# Patient Record
Sex: FEMALE | Race: WHITE | NOT HISPANIC OR LATINO | Employment: OTHER | URBAN - NONMETROPOLITAN AREA
[De-identification: names, ages, dates, MRNs, and addresses within clinical notes are randomized per-mention and may not be internally consistent; named-entity substitution may affect disease eponyms.]

---

## 2022-12-27 ENCOUNTER — APPOINTMENT (EMERGENCY)
Dept: RADIOLOGY | Facility: HOSPITAL | Age: 82
End: 2022-12-27

## 2022-12-27 ENCOUNTER — APPOINTMENT (EMERGENCY)
Dept: CT IMAGING | Facility: HOSPITAL | Age: 82
End: 2022-12-27

## 2022-12-27 ENCOUNTER — HOSPITAL ENCOUNTER (INPATIENT)
Facility: HOSPITAL | Age: 82
LOS: 3 days | Discharge: HOME HEALTH | End: 2022-12-30
Attending: EMERGENCY MEDICINE | Admitting: FAMILY MEDICINE

## 2022-12-27 DIAGNOSIS — T79.6XXA TRAUMATIC RHABDOMYOLYSIS, INITIAL ENCOUNTER (HCC): ICD-10-CM

## 2022-12-27 DIAGNOSIS — I48.91 ATRIAL FIBRILLATION WITH RVR (HCC): Primary | ICD-10-CM

## 2022-12-27 DIAGNOSIS — K80.20 CALCULUS OF GALLBLADDER WITHOUT CHOLECYSTITIS WITHOUT OBSTRUCTION: ICD-10-CM

## 2022-12-27 DIAGNOSIS — M62.82 RHABDOMYOLYSIS: ICD-10-CM

## 2022-12-27 DIAGNOSIS — R65.10 SIRS (SYSTEMIC INFLAMMATORY RESPONSE SYNDROME) (HCC): ICD-10-CM

## 2022-12-27 DIAGNOSIS — D72.829 LEUKOCYTOSIS, UNSPECIFIED TYPE: ICD-10-CM
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
## 2022-12-27 PROBLEM — E66.01 MORBID OBESITY (HCC): Status: ACTIVE | Noted: 2022-12-27
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
## 2022-12-27 PROBLEM — E87.20 LACTIC ACIDOSIS: Status: ACTIVE | Noted: 2022-12-27

## 2022-12-27 LAB
2HR DELTA HS TROPONIN: 0 NG/L
ALBUMIN SERPL BCP-MCNC: 3.7 G/DL (ref 3.5–5)
ALP SERPL-CCNC: 49 U/L (ref 46–116)
ALT SERPL W P-5'-P-CCNC: 29 U/L (ref 12–78)
ANION GAP SERPL CALCULATED.3IONS-SCNC: 15 MMOL/L (ref 4–13)
APTT PPP: 24 SECONDS (ref 23–37)
AST SERPL W P-5'-P-CCNC: 53 U/L (ref 5–45)
BASOPHILS # BLD AUTO: 0.13 THOUSANDS/ÂΜL (ref 0–0.1)
BASOPHILS NFR BLD AUTO: 1 % (ref 0–1)
BILIRUB SERPL-MCNC: 3.55 MG/DL (ref 0.2–1)
BUN SERPL-MCNC: 18 MG/DL (ref 5–25)
CALCIUM SERPL-MCNC: 9.2 MG/DL (ref 8.3–10.1)
CARDIAC TROPONIN I PNL SERPL HS: 14 NG/L
CARDIAC TROPONIN I PNL SERPL HS: 14 NG/L
CHLORIDE SERPL-SCNC: 102 MMOL/L (ref 96–108)
CK MB SERPL-MCNC: 7.7 NG/ML (ref 0–5)
CK MB SERPL-MCNC: <1 % (ref 0–2.5)
CK SERPL-CCNC: 1293 U/L (ref 26–192)
CO2 SERPL-SCNC: 24 MMOL/L (ref 21–32)
CREAT SERPL-MCNC: 0.95 MG/DL (ref 0.6–1.3)
EOSINOPHIL # BLD AUTO: 0.11 THOUSAND/ÂΜL (ref 0–0.61)
EOSINOPHIL NFR BLD AUTO: 1 % (ref 0–6)
ERYTHROCYTE [DISTWIDTH] IN BLOOD BY AUTOMATED COUNT: 13.1 % (ref 11.6–15.1)
FLUAV RNA RESP QL NAA+PROBE: NEGATIVE
FLUBV RNA RESP QL NAA+PROBE: NEGATIVE
GFR SERPL CREATININE-BSD FRML MDRD: 56 ML/MIN/1.73SQ M
GLUCOSE SERPL-MCNC: 118 MG/DL (ref 65–140)
HCT VFR BLD AUTO: 49.6 % (ref 34.8–46.1)
HGB BLD-MCNC: 16.1 G/DL (ref 11.5–15.4)
IMM GRANULOCYTES # BLD AUTO: 0.18 THOUSAND/UL (ref 0–0.2)
IMM GRANULOCYTES NFR BLD AUTO: 1 % (ref 0–2)
INR PPP: 1.21 (ref 0.84–1.19)
LACTATE SERPL-SCNC: 1.8 MMOL/L (ref 0.5–2)
LACTATE SERPL-SCNC: 3.5 MMOL/L (ref 0.5–2)
LYMPHOCYTES # BLD AUTO: 1.3 THOUSANDS/ÂΜL (ref 0.6–4.47)
LYMPHOCYTES NFR BLD AUTO: 5 % (ref 14–44)
MCH RBC QN AUTO: 30.5 PG (ref 26.8–34.3)
MCHC RBC AUTO-ENTMCNC: 32.5 G/DL (ref 31.4–37.4)
MCV RBC AUTO: 94 FL (ref 82–98)
MONOCYTES # BLD AUTO: 1.92 THOUSAND/ÂΜL (ref 0.17–1.22)
MONOCYTES NFR BLD AUTO: 8 % (ref 4–12)
NEUTROPHILS # BLD AUTO: 20.44 THOUSANDS/ÂΜL (ref 1.85–7.62)
NEUTS SEG NFR BLD AUTO: 84 % (ref 43–75)
NRBC BLD AUTO-RTO: 0 /100 WBCS
PLATELET # BLD AUTO: 243 THOUSANDS/UL (ref 149–390)
PMV BLD AUTO: 11.5 FL (ref 8.9–12.7)
POTASSIUM SERPL-SCNC: 4.1 MMOL/L (ref 3.5–5.3)
PROT SERPL-MCNC: 7.6 G/DL (ref 6.4–8.4)
PROTHROMBIN TIME: 15.4 SECONDS (ref 11.6–14.5)
RBC # BLD AUTO: 5.28 MILLION/UL (ref 3.81–5.12)
RSV RNA RESP QL NAA+PROBE: NEGATIVE
SARS-COV-2 RNA RESP QL NAA+PROBE: NEGATIVE
SODIUM SERPL-SCNC: 141 MMOL/L (ref 135–147)
WBC # BLD AUTO: 24.08 THOUSAND/UL (ref 4.31–10.16)

## 2022-12-27 RX ORDER — DILTIAZEM HYDROCHLORIDE 5 MG/ML
15 INJECTION INTRAVENOUS ONCE
Status: COMPLETED | OUTPATIENT
Start: 2022-12-27 | End: 2022-12-27

## 2022-12-27 RX ORDER — METOPROLOL TARTRATE 5 MG/5ML
5 INJECTION INTRAVENOUS EVERY 6 HOURS PRN
Status: DISCONTINUED | OUTPATIENT
Start: 2022-12-27 | End: 2022-12-30 | Stop reason: HOSPADM

## 2022-12-27 RX ORDER — SODIUM CHLORIDE 9 MG/ML
125 INJECTION, SOLUTION INTRAVENOUS CONTINUOUS
Status: DISCONTINUED | OUTPATIENT
Start: 2022-12-27 | End: 2022-12-28

## 2022-12-27 RX ORDER — ONDANSETRON 2 MG/ML
4 INJECTION INTRAMUSCULAR; INTRAVENOUS EVERY 6 HOURS PRN
Status: DISCONTINUED | OUTPATIENT
Start: 2022-12-27 | End: 2022-12-30 | Stop reason: HOSPADM

## 2022-12-27 RX ORDER — METOPROLOL TARTRATE 50 MG/1
50 TABLET, FILM COATED ORAL 2 TIMES DAILY
Status: DISCONTINUED | OUTPATIENT
Start: 2022-12-27 | End: 2022-12-28

## 2022-12-27 RX ORDER — METOPROLOL TARTRATE 50 MG/1
50 TABLET, FILM COATED ORAL 2 TIMES DAILY
COMMUNITY
Start: 2022-04-05 | End: 2022-12-30

## 2022-12-27 RX ORDER — ACETAMINOPHEN 325 MG/1
650 TABLET ORAL EVERY 6 HOURS PRN
Status: DISCONTINUED | OUTPATIENT
Start: 2022-12-27 | End: 2022-12-30 | Stop reason: HOSPADM

## 2022-12-27 RX ORDER — DOCUSATE SODIUM 100 MG/1
100 CAPSULE, LIQUID FILLED ORAL 2 TIMES DAILY
Status: DISCONTINUED | OUTPATIENT
Start: 2022-12-27 | End: 2022-12-30 | Stop reason: HOSPADM

## 2022-12-27 RX ORDER — METOPROLOL TARTRATE 5 MG/5ML
5 INJECTION INTRAVENOUS ONCE
Status: COMPLETED | OUTPATIENT
Start: 2022-12-27 | End: 2022-12-27

## 2022-12-27 RX ADMIN — SODIUM CHLORIDE 125 ML/HR: 0.9 INJECTION, SOLUTION INTRAVENOUS at 21:45

## 2022-12-27 RX ADMIN — SODIUM CHLORIDE 1000 ML: 0.9 INJECTION, SOLUTION INTRAVENOUS at 17:48

## 2022-12-27 RX ADMIN — DILTIAZEM HYDROCHLORIDE 15 MG: 5 INJECTION INTRAVENOUS at 18:20

## 2022-12-27 RX ADMIN — DOCUSATE SODIUM 100 MG: 100 CAPSULE ORAL at 19:48

## 2022-12-27 RX ADMIN — IOHEXOL 100 ML: 350 INJECTION, SOLUTION INTRAVENOUS at 17:57

## 2022-12-27 RX ADMIN — METOPROLOL TARTRATE 50 MG: 50 TABLET, FILM COATED ORAL at 19:48

## 2022-12-27 RX ADMIN — METOROPROLOL TARTRATE 5 MG: 5 INJECTION, SOLUTION INTRAVENOUS at 18:18

## 2022-12-27 RX ADMIN — SODIUM CHLORIDE 1000 ML: 0.9 INJECTION, SOLUTION INTRAVENOUS at 19:03

## 2022-12-27 NOTE — ED PROVIDER NOTES
Emergency Department Trauma Note  Ngozi Cano [de-identified] y o  female MRN: [id number]  Unit/Bed#: ED 03/ED 03 Encounter: [id number]      Trauma Alert: Trauma Acuity: Trauma Evaluation  Model of Arrival: Mode of Arrival: BLS (none) via    Trauma Team: Current Providers  Attending Provider: Shawn Perez MD  Attending Provider: Chrissie Peck MD  Registered Nurse: Suyapa Pardo RN  Consultants: None      History of Present Illness     Chief Complaint:   Chief Complaint   Patient presents with   • Fall     Pt tripped & fell 12/26 @ 0900  Did not hit head, denies LOC  Pt was on the floor for 26 hours  Pt's only complaint is left rib pain  Pt A&Ox4  HPI:  Ngozi Cano is a [de-identified] y o  female who presents with fall  Mechanism:Details of Incident: Pt walking to kitchen with two full ands and triped over carpet and fell to the ground  Pt laid on floor for 26 hours unable to get up  Injury Date: 12/26/22 Injury Time: 0900 Injury Occurence Location - [address] [place] [address] Way: Kitchen    Patient was carrying things with both hands  Tripped over her rug at 9 AM   Truman Nova to the floor  Is on Eliquis  States she did not hit her head  This occurred yesterday morning  Has been on the floor since then  States she used the trash can to go to the bathroom  Crawled to the phone to get help today  No chest pain  No shortness of breath  No abdominal pain or back pain  Complains of a large bruise on her left breast   States she could not get up because both her legs felt heavy  No change in speech or vision        History provided by:  Patient  Fall  Mechanism of injury: fall    Injury location: Left chest   Incident location:  Home  Time since incident:  32 hours  Fall:     Fall occurred:  [facility] [employer] of impact: Left chest     Entrapped after fall: no    Protective equipment: none    Suspicion of alcohol use: no    Suspicion of drug use: no    Prior to arrival data:     Bystander interventions:  None    Patient ambulatory at scene: no      Blood loss:  None    Responsiveness at scene:  Alert    Orientation at scene:  Person, place, situation and time    Loss of consciousness: no      Amnesic to event: no      Airway interventions:  None    Breathing interventions:  None    IV access status:  None    IO access:  None    Fluids administered:  None    Cardiac interventions:  None    Medications administered:  None    Immobilization:  None    Airway condition since incident:  Stable    Breathing condition since incident:  Stable    Circulation condition since incident:  Stable    Mental status condition since incident:  Stable    Disability condition since incident:  Stable  Associated symptoms: nausea    Associated symptoms: no abdominal pain, no back pain, no chest pain, no headaches, no hearing loss, no neck pain, no seizures and no vomiting    Risk factors: anticoagulation therapy      Review of Systems   Constitutional: Negative for chills and fever  HENT: Negative for ear pain, hearing loss, sore throat, trouble swallowing and voice change  Eyes: Negative for pain and discharge  Respiratory: Negative for cough, shortness of breath and wheezing  Cardiovascular: Negative for chest pain and palpitations  Gastrointestinal: Positive for nausea  Negative for abdominal pain, blood in stool, constipation, diarrhea and vomiting  Genitourinary: Negative for dysuria, flank pain, frequency and hematuria  Musculoskeletal: Negative for back pain, joint swelling, neck pain and neck stiffness  Skin: Negative for rash and wound  Neurological: Negative for dizziness, seizures, syncope, facial asymmetry and headaches  Psychiatric/Behavioral: Negative for hallucinations, self-injury and suicidal ideas  All other systems reviewed and are negative        Historical Information     Immunizations:   Immunization History   Administered Date(s) Administered   • COVID-19 PFIZER VACCINE 0 3 ML IM 02/23/2021       Past Medical History: Diagnosis Date   • Atrial fibrillation [facility])    • Breast cancer in female [facility])      History reviewed  No pertinent family history  Past Surgical History:   Procedure Laterality Date   • BREAST SURGERY       Social History     Tobacco Use   • Smoking status: Former     Types: Cigarettes   • Smokeless tobacco: Never   Substance Use Topics   • Alcohol use: Never   • Drug use: Never     E-Cigarette/Vaping     E-Cigarette/Vaping Substances       Family History: Noncontributory    Meds/Allergies   Prior to Admission Medications   Prescriptions Last Dose Informant Patient Reported? Taking? apixaban (ELIQUIS) 5 mg   Yes Yes   Sig: Take 5 mg by mouth 2 (two) times a day   metoprolol tartrate (LOPRESSOR) 50 mg tablet   Yes Yes   Sig: Take 50 mg by mouth 2 (two) times a day      Facility-Administered Medications: None       Allergies   Allergen Reactions   • Penicillins Anaphylaxis       PHYSICAL EXAM    PE limited by: Nothing    Objective   Vitals:   First set: Temperature: 98 8 °F (37 1 °C) (12/27/22 1735)  Pulse: (!) 121 (12/27/22 1735)  Respirations: 18 (12/27/22 1735)  Blood Pressure: 121/69 (12/27/22 1735)  SpO2: 96 % (12/27/22 1735)    Primary Survey:   (A) Airway: Intact  (B) Breathing: Spontaneous  (C) Circulation: Pulses: Radial pulse 2+ bilaterally  (D) Disabliity: GCS 15  (E) Expose: Done    Secondary Survey: (Click on Physical Exam tab above)  Physical Exam  Vitals and nursing note reviewed  Constitutional:       General: She is not in acute distress  Appearance: She is well-developed  HENT:      Head: Normocephalic and atraumatic  Right Ear: External ear normal       Left Ear: External ear normal    Eyes:      General: No scleral icterus  Right eye: No discharge  Left eye: No discharge  Extraocular Movements: Extraocular movements intact  Conjunctiva/sclera: Conjunctivae normal    Cardiovascular:      Rate and Rhythm: Tachycardia present  Rhythm irregular        Heart sounds: Normal heart sounds  No murmur heard  Pulmonary:      Effort: Pulmonary effort is normal       Breath sounds: Normal breath sounds  No wheezing or rales  Comments: Large contusion over the superior aspect of the left breast   Abdominal:      General: Bowel sounds are normal  There is no distension  Palpations: Abdomen is soft  Tenderness: There is no abdominal tenderness  There is no guarding or rebound  Musculoskeletal:         General: No tenderness or deformity  Cervical back: Normal range of motion and neck supple  Comments: Good range of motion of the lower extremities with passive movement  Skin:     General: Skin is warm and dry  Findings: No rash  Neurological:      General: No focal deficit present  Mental Status: She is alert and oriented to person, place, and time  Cranial Nerves: No cranial nerve deficit  Psychiatric:         Mood and Affect: Mood normal          Behavior: Behavior normal          Thought Content: Thought content normal          Judgment: Judgment normal          Cervical spine cleared by clinical criteria? No    Invasive Devices     Peripheral Intravenous Line  Duration           Peripheral IV 12/27/22 Left Antecubital <1 day                Lab Results:   Results Reviewed     Procedure Component Value Units Date/Time    HS Troponin 0hr (reflex protocol) [[id number]]  (Normal) Collected: 12/27/22 1823    Lab Status: Final result Specimen: Blood from Hand, Left Updated: 12/27/22 1850     hs TnI 0hr 14 ng/L     HS Troponin I 2hr [[id number]]     Lab Status: No result Specimen: Blood     CK (with reflex to MB) [[id number]]  (Abnormal) Collected: 12/27/22 1743    Lab Status: Final result Specimen: Blood from Arm, Left Updated: 12/27/22 1845     Total CK 1,293 U/L     CKMB [[id number]] Collected: 12/27/22 1743    Lab Status:  In process Specimen: Blood from Arm, Left Updated: 12/27/22 1845    FLU/RSV/COVID - if FLU/RSV clinically relevant [[id number]]  (Normal) Collected: 12/27/22 1748    Lab Status: Final result Specimen: Nares from Nose Updated: 12/27/22 1829     SARS-CoV-2 Negative     INFLUENZA A PCR Negative     INFLUENZA B PCR Negative     RSV PCR Negative    Narrative:      FOR PEDIATRIC PATIENTS - copy/paste COVID Guidelines URL to browser: [email or web address]    SARS-CoV-2 assay is a Nucleic Acid Amplification assay intended for the  qualitative detection of nucleic acid from SARS-CoV-2 in nasopharyngeal  swabs  Results are for the presumptive identification of SARS-CoV-2 RNA  Positive results are indicative of infection with SARS-CoV-2, the virus  causing COVID-19, but do not rule out bacterial infection or co-infection  with other viruses  Laboratories within the [place] and its  territories are required to report all positive results to the appropriate  public health authorities  Negative results do not preclude SARS-CoV-2  infection and should not be used as the sole basis for treatment or other  patient management decisions  Negative results must be combined with  clinical observations, patient history, and epidemiological information  This test has not been FDA cleared or approved  This test has been authorized by FDA under an Emergency Use Authorization  (EUA)  This test is only authorized for the duration of time the  declaration that circumstances exist justifying the authorization of the  emergency use of an in vitro diagnostic tests for detection of SARS-CoV-2  virus and/or diagnosis of COVID-19 infection under section 564(b)(1) of  the Act, 21 U  S C  045WFS-2(I)(7), unless the authorization is terminated  or revoked sooner  The test has been validated but independent review by FDA  and CLIA is pending  Test performed using "[employer] GeneXpert: This RT-PCR assay targets N2,  a region unique to SARS-CoV-2   A conserved region in the E-gene was chosen  for pan-Sarbecovirus detection which includes SARS-CoV-2  According to CMS-2020-01-R, this platform meets the definition of high-throughput technology  Lactic acid [[id number]]  (Abnormal) Collected: 12/27/22 1752    Lab Status: Final result Specimen: Blood from Arm, Left Updated: 12/27/22 1827     LACTIC ACID 3 5 mmol/L     Narrative:      Result may be elevated if tourniquet was used during collection  Lactic acid 2 Hours [[id number]]     Lab Status: No result Specimen: Blood     Blood culture #2 [[id number]] Collected: 12/27/22 1816    Lab Status:  In process Specimen: Blood from Hand, Left Updated: 12/27/22 1818    Comprehensive metabolic panel [[id number]]  (Abnormal) Collected: 12/27/22 1743    Lab Status: Final result Specimen: Blood from Arm, Left Updated: 12/27/22 1817     Sodium 141 mmol/L      Potassium 4 1 mmol/L      Chloride 102 mmol/L      CO2 24 mmol/L      ANION GAP 15 mmol/L      BUN 18 mg/dL      Creatinine 0 95 mg/dL      Glucose 118 mg/dL      Calcium 9 2 mg/dL      AST 53 U/L      ALT 29 U/L      Alkaline Phosphatase 49 U/L      Total Protein 7 6 g/dL      Albumin 3 7 g/dL      Total Bilirubin 3 55 mg/dL      eGFR 56 ml/min/1 73sq m     Narrative:      Barbara guidelines for Chronic Kidney Disease (CKD):   •  Stage 1 with normal or high GFR (GFR > 90 mL/min/1 73 square meters)  •  Stage 2 Mild CKD (GFR = 60-89 mL/min/1 73 square meters)  •  Stage 3A Moderate CKD (GFR = 45-59 mL/min/1 73 square meters)  •  Stage 3B Moderate CKD (GFR = 30-44 mL/min/1 73 square meters)  •  Stage 4 Severe CKD (GFR = 15-29 mL/min/1 73 square meters)  •  Stage 5 End Stage CKD (GFR <15 mL/min/1 73 square meters)  Note: GFR calculation is accurate only with a steady state creatinine    Protime-INR [[id number]]  (Abnormal) Collected: 12/27/22 1743    Lab Status: Final result Specimen: Blood from Arm, Left Updated: 12/27/22 1800     Protime 15 4 seconds      INR 1 21    APTT [[id number]] (Normal) Collected: 12/27/22 1743    Lab Status: Final result Specimen: Blood from Arm, Left Updated: 12/27/22 1800     PTT 24 seconds     Blood culture #1 [[id number]] Collected: 12/27/22 1750    Lab Status: In process Specimen: Blood from Arm, Left Updated: 12/27/22 1752    CBC and differential [[id number]]  (Abnormal) Collected: 12/27/22 1743    Lab Status: Final result Specimen: Blood from Arm, Left Updated: 12/27/22 1747     WBC 24 08 Thousand/uL      RBC 5 28 Million/uL      Hemoglobin 16 1 g/dL      Hematocrit 49 6 %      MCV 94 fL      MCH 30 5 pg      MCHC 32 5 g/dL      RDW 13 1 %      MPV 11 5 fL      Platelets 528 Thousands/uL      nRBC 0 /100 WBCs      Neutrophils Relative 84 %      Immat GRANS % 1 %      Lymphocytes Relative 5 %      Monocytes Relative 8 %      Eosinophils Relative 1 %      Basophils Relative 1 %      Neutrophils Absolute 20 44 Thousands/µL      Immature Grans Absolute 0 18 Thousand/uL      Lymphocytes Absolute 1 30 Thousands/µL      Monocytes Absolute 1 92 Thousand/µL      Eosinophils Absolute 0 11 Thousand/µL      Basophils Absolute 0 13 Thousands/µL     UA w Reflex to Microscopic w Reflex to Culture [[id number]]     Lab Status: No result Specimen: Urine                  Imaging Studies:   Direct to CT: Yes  TRAUMA - CT head wo contrast   Final Result by Leonie Banuelos MD (12/27 1810)      No acute intracranial pathology  In particular, no intracranial hemorrhage or calvarial fracture  The study was marked in [facility] for immediate notification  Workstation performed: [id number]         TRAUMA - CT spine cervical wo contrast   Final Result by Leonie Banuelos MD (12/27 1814)      No cervical spine fracture or traumatic malalignment  Moderate to severe degenerative changes most pronounced at C4-C7  The study was marked in [facility] for immediate notification        Workstation performed: [id number]         TRAUMA - CT chest abdomen pelvis w contrast   Final Result by Ang Fisher MD (12/27 1836)      No evidence of acute thoracic, abdominal, or pelvic trauma  Endometrial stripe thickened up to 6 mm  Recommend further evaluation with nonemergent pelvic ultrasound  This may be related to endometrial hyperplasia, an endometrial polyp or possibly neoplasm  1 6 x 1 4 cm somewhat irregular lesion in the inner lower right breast with bulky calcifications  This may be related to postsurgical changes from prior lumpectomy  Recommend correlation with prior breast imaging  Multiple additional incidental findings as above  The study was marked in [facility] for immediate notification  Workstation performed: [id number]         XR Trauma chest portable   ED Interpretation by Shawn Perez MD (12/27 5107)   No pneumothorax      Final Result by Ang Fisher MD (12/27 4907)      No acute pulmonary pathology  Workstation performed: [id number]         XR Trauma pelvis ap only 1 or 2 vw   ED Interpretation by Shawn Perez MD (12/27 2045)   No fracture      Final Result by Ang Fisher MD (12/27 5847)      No definite fracture or hip dislocation        Workstation performed: [id number]               Procedures  ECG 12 Lead Documentation Only    Date/Time: 12/27/2022 5:40 PM  Performed by: Shawn Perez MD  Authorized by: Shawn Perez MD     ECG reviewed by me, the ED Provider: yes    Patient location:  ED  Rate:     ECG rate:  140  Rhythm:     Rhythm: atrial fibrillation      CriticalCare Time  Performed by: Shawn Perez MD  Authorized by: Shawn Perez MD     Critical care provider statement:     Critical care time (minutes):  35    Critical care was necessary to treat or prevent imminent or life-threatening deterioration of the following conditions:  Cardiac failure and trauma    Critical care was time spent personally by me on the following activities:  Evaluation of patient's response to treatment, examination of patient, review of old charts, re-evaluation of patient's condition, ordering and review of radiographic studies, ordering and review of laboratory studies and ordering and performing treatments and interventions             ED Course  ED Course as of 12/27/22 1855   Aleksandere Dec 27, 2022   1809 Echo done in 2021 with an EF of 55 to 60%  1816 Patient with a BMI greater than 30  Will use ideal body weight for the IV fluid bolus  1817 Cervical Collar Clearance: The patient had a CT scan of the cervical spine demonstrating no acute injury  On exam, the patient had no midline point tenderness or paresthesias/numbness/weakness in the extremities  The patient had full range of motion (was then able to flex, extend, and rotate head laterally) without pain  There were no distracting injuries and the patient was not intoxicated  The patient's cervical spine was cleared radiologically and clinically  Cervical collar removed at this time  Shawn Perez MD  12/27/2022 6:18 PM       1855 CAT scan result noted  Patient has no abdominal pain in the right upper quadrant             MDM  Number of Diagnoses or Management Options     Amount and/or Complexity of Data Reviewed  Clinical lab tests: reviewed  Review and summarize past medical records: yes            Disposition  [facility] Transfer: No  Final diagnoses:   Atrial fibrillation with RVR (HCC)   SIRS (systemic inflammatory response syndrome) (HCC)   Leukocytosis, unspecified type   Calculus of gallbladder without cholecystitis without obstruction   Rhabdomyolysis     Time reflects when diagnosis was documented in both MDM as applicable and the Disposition within this note     Time User Action Codes Description Comment    12/27/2022  6:14 PM Juliann Rung Add [I48 91] Atrial fibrillation with RVR ([facility] )     12/27/2022  6:22 PM Risa PEDERSEN Add [R65 10] SIRS (systemic inflammatory response syndrome) ([facility] )     12/27/2022  6:40 PM Juliann Rung Add [A36 605] Leukocytosis, unspecified type     12/27/2022  6:46 PM NathanWander tomasraymundohannah Hermosillo Add [K80 20] Calculus of gallbladder without cholecystitis without obstruction     12/27/2022  6:47 PM Jeannine Loza Add [J97 42] Rhabdomyolysis       ED Disposition     ED Disposition   Admit    Condition   Stable    Date/Time   Tue Dec 27, 2022  6:54 PM    Comment   Case was discussed with  Dr Deandre Garcia and the patient's admission status was agreed to be  to the service of Dr Deandre Garcia   Follow-up Information    None       Patient's Medications   Discharge Prescriptions    No medications on file     No discharge procedures on file      PDMP Review     None          ED Provider  Electronically Signed by         Brandon Mclean MD  12/27/22 0047

## 2022-12-27 NOTE — LETTER
[address] [place] [address]  [address] [facility] [address]      January 6, 2023    MRN: [id number]     Phone: [phone]     Dear Ms  Eli Medina recently had a(n)  performed on  at  [address] [place] [address] that was requested by Mary Nieves MD  The study was reviewed by a radiologist, which is a physician who specializes in medical imaging  The radiologist issued a report describing his or her findings  In that report there was a finding that the radiologist felt warranted further discussion with your health care provider and that discussion would be beneficial to you  The results were sent to Mary Nieves MD on    We recommend that you contact Mary Nieves MD at  or set up an appointment to discuss the results of the imaging test  If you have already heard from Mary Nieves MD regarding the results of your study, you can disregard this letter  This letter is not meant to alarm you, but intended to encourage you to follow-up on your results with the provider that sent you for the imaging study  In addition, we have enclosed answers to frequently asked questions by other patients who have also received a letter to review results with their health care provider (see page two)  Thank you for choosing [facility] for your medical imaging needs  FREQUENTLY ASKED QUESTIONS    1  Why am I receiving this letter? [facility] requires us to notify patients who have findings on imaging exams that may require more testing or follow-up with a health professional within the next 3 months  2  How serious is the finding on the imaging test?  This letter is sent to all patients who may need follow-up or more testing within the next 3 months    Receiving this letter does not necessarily mean you have a life-threatening imaging finding or disease  Recommendations in the radiologist’s imaging report are general in nature and it is up to your healthcare provider to say whether those recommendations make sense for your situation  You are strongly encouraged to talk to your health care provider about the results and ask whether additional steps need to be taken  3  Where can I get a copy of the final report for my recent radiology exam?  To get a full copy of the report you can access your records online at [email or web address] or please contact [facility] Medical Records Department at [phone] Monday through Friday between 8 am and 6 pm          4  What do I need to do now? Please contact your health care provider who requested the imaging study to discuss what further actions (if any) are needed  You may have already heard from (your ordering provider) in regard to this test in which case you can disregard this letter  NOTICE IN ACCORDANCE WITH THE [place] STATE “PATIENT TEST RESULT INFORMATION ACT OF 2018”    You are receiving this notice as a result of a determination by your diagnostic imaging service that further discussions of your test results are warranted and would be beneficial to you  The complete results of your test or tests have been or will be sent to the health care practitioner that ordered the test or tests  It is recommended that you contact your health care practitioner to discuss your results as soon as possible

## 2022-12-28 PROBLEM — T79.6XXA TRAUMATIC RHABDOMYOLYSIS (HCC): Status: ACTIVE | Noted: 2022-12-27

## 2022-12-28 LAB
4HR DELTA HS TROPONIN: 0 NG/L
ALBUMIN SERPL BCP-MCNC: 2.8 G/DL (ref 3.5–5)
ALP SERPL-CCNC: 39 U/L (ref 46–116)
ALT SERPL W P-5'-P-CCNC: 23 U/L (ref 12–78)
ANION GAP SERPL CALCULATED.3IONS-SCNC: 14 MMOL/L (ref 4–13)
AST SERPL W P-5'-P-CCNC: 35 U/L (ref 5–45)
BILIRUB SERPL-MCNC: 3.31 MG/DL (ref 0.2–1)
BUN SERPL-MCNC: 16 MG/DL (ref 5–25)
CALCIUM ALBUM COR SERPL-MCNC: 9.1 MG/DL (ref 8.3–10.1)
CALCIUM SERPL-MCNC: 8.1 MG/DL (ref 8.3–10.1)
CARDIAC TROPONIN I PNL SERPL HS: 14 NG/L
CHLORIDE SERPL-SCNC: 107 MMOL/L (ref 96–108)
CK MB SERPL-MCNC: 3.5 NG/ML (ref 0–5)
CK MB SERPL-MCNC: <1 % (ref 0–2.5)
CK SERPL-CCNC: 733 U/L (ref 26–192)
CO2 SERPL-SCNC: 22 MMOL/L (ref 21–32)
CREAT SERPL-MCNC: 0.77 MG/DL (ref 0.6–1.3)
ERYTHROCYTE [DISTWIDTH] IN BLOOD BY AUTOMATED COUNT: 13.2 % (ref 11.6–15.1)
GFR SERPL CREATININE-BSD FRML MDRD: 73 ML/MIN/1.73SQ M
GLUCOSE SERPL-MCNC: 102 MG/DL (ref 65–140)
HCT VFR BLD AUTO: 42.8 % (ref 34.8–46.1)
HGB BLD-MCNC: 13.7 G/DL (ref 11.5–15.4)
MCH RBC QN AUTO: 30.6 PG (ref 26.8–34.3)
MCHC RBC AUTO-ENTMCNC: 32 G/DL (ref 31.4–37.4)
MCV RBC AUTO: 96 FL (ref 82–98)
PLATELET # BLD AUTO: 195 THOUSANDS/UL (ref 149–390)
PMV BLD AUTO: 11.2 FL (ref 8.9–12.7)
POTASSIUM SERPL-SCNC: 3.9 MMOL/L (ref 3.5–5.3)
PROT SERPL-MCNC: 6 G/DL (ref 6.4–8.4)
QRS AXIS: 97 DEGREES
QRSD INTERVAL: 74 MS
QT INTERVAL: 286 MS
QTC INTERVAL: 439 MS
RBC # BLD AUTO: 4.47 MILLION/UL (ref 3.81–5.12)
SODIUM SERPL-SCNC: 143 MMOL/L (ref 135–147)
T WAVE AXIS: -13 DEGREES
TSH SERPL DL<=0.05 MIU/L-ACNC: 1.33 UIU/ML (ref 0.45–4.5)
VENTRICULAR RATE: 142 BPM
WBC # BLD AUTO: 22.13 THOUSAND/UL (ref 4.31–10.16)

## 2022-12-28 RX ORDER — METOPROLOL TARTRATE 5 MG/5ML
5 INJECTION INTRAVENOUS ONCE
Status: COMPLETED | OUTPATIENT
Start: 2022-12-28 | End: 2022-12-28

## 2022-12-28 RX ORDER — DILTIAZEM HYDROCHLORIDE 5 MG/ML
15 INJECTION INTRAVENOUS ONCE
Status: COMPLETED | OUTPATIENT
Start: 2022-12-28 | End: 2022-12-28

## 2022-12-28 RX ADMIN — METOROPROLOL TARTRATE 5 MG: 5 INJECTION, SOLUTION INTRAVENOUS at 01:53

## 2022-12-28 RX ADMIN — METOROPROLOL TARTRATE 5 MG: 5 INJECTION, SOLUTION INTRAVENOUS at 18:02

## 2022-12-28 RX ADMIN — APIXABAN 5 MG: 5 TABLET, FILM COATED ORAL at 09:19

## 2022-12-28 RX ADMIN — DILTIAZEM HYDROCHLORIDE 15 MG: 5 INJECTION INTRAVENOUS at 11:46

## 2022-12-28 RX ADMIN — METOROPROLOL TARTRATE 5 MG: 5 INJECTION, SOLUTION INTRAVENOUS at 07:05

## 2022-12-28 RX ADMIN — APIXABAN 5 MG: 5 TABLET, FILM COATED ORAL at 17:40

## 2022-12-28 RX ADMIN — METOPROLOL TARTRATE 50 MG: 50 TABLET, FILM COATED ORAL at 07:18

## 2022-12-28 RX ADMIN — METOPROLOL TARTRATE 75 MG: 50 TABLET, FILM COATED ORAL at 17:40

## 2022-12-28 RX ADMIN — METOROPROLOL TARTRATE 5 MG: 5 INJECTION, SOLUTION INTRAVENOUS at 09:18

## 2022-12-28 NOTE — ASSESSMENT & PLAN NOTE
She had a mechanical fall at home where she tripped over a rug and was unable to stand up afterwards for almost 30 hours  she did not take any of her medications or eat or drink for that time  Finally was able to call 911 and her family    Will hydrate the patient and get PT OT eval   Fortunately trauma series is negative for any fractures  Bruising noted secondary to being on the floor on her left breast and her legs which do not appear to be too concerning however we resume Eliquis

## 2022-12-28 NOTE — ASSESSMENT & PLAN NOTE
Lactic acidosis of 3 5 noted secondary to dehydration and rhabdomyolysis  placed on IV fluids and recheck in the morning  Also noted to have leukocytosis secondary to dehydration but no evidence of infection at this time

## 2022-12-28 NOTE — CASE MANAGEMENT
Case Management Assessment & Discharge Planning Note    Patient name Cathi Mccollum  Location [address] [id number] MRN [id number]  : 1940 Date 2022       Current Admission Date: 2022  Current Admission Diagnosis:Fall at home, initial encounter   Patient Active Problem List    Diagnosis Date Noted   • Non-traumatic rhabdomyolysis 2022   • Fall at home, initial encounter 2022   • Atrial fibrillation with RVR ([facility] [address] ) 2022   • Lactic acidosis 2022   • Morbid obesity ([facility] [address] ) 2022      LOS (days): 1  Geometric Mean LOS (GMLOS) (days): 3 00  Days to GMLOS:2 3     OBJECTIVE:    Risk of Unplanned Readmission Score: 9 84         Current admission status: Inpatient       Preferred Pharmacy:   [address] [place] [address]  Phone: [phone] Fax: [phone]    Primary Care Provider: Kavita Castrejon DO    Primary Insurance: [facility]  Secondary Insurance:     ASSESSMENT:  [address] Proxies    There are no active Health Care Proxies on file  Advance Directives  Does patient have a [address]?: No  Was patient offered paperwork?: Yes (patient declined)  Does patient currently have a Health Care decision maker?: Yes, please see Health Care Proxy section  Does patient have Advance Directives?: No  Was patient offered paperwork?: Yes (patient declined)  Primary Contact: Jessica Lópezstephani, daughter         Readmission Root Cause  30 Day Readmission: No    Patient Information  Admitted from[de-identified] Home  Mental Status: Alert  During Assessment patient was accompanied by: Daughter  Assessment information provided by[de-identified] Patient  Primary Caregiver: Self  Support Systems: Daughter, Family members  [place] of Residence: [facility] do you live in?: [facility] entry access options   Select all that apply : Stairs  Number of steps to enter home : 7 (7 steps in front, 4 steps in back)  Do the steps have railings?: Yes  Type of Current Residence: 2 story home  Upon entering residence, is there a bedroom on the main floor (no further steps)?: No  A bedroom is located on the following floor levels of residence (select all that apply):: 2nd Floor  Upon entering residence, is there a bathroom on the main floor (no further steps)?: No  Indicate which floors of current residence have a bathroom (select all the apply):: 2nd Floor  Number of steps to 2nd floor from main floor: One Flight  In the last 12 months, was there a time when you were not able to pay the mortgage or rent on time?: No  In the last 12 months, how many places have you lived?: 1  In the last 12 months, was there a time when you did not have a steady place to sleep or slept in a shelter (including now)?: No  Homeless/housing insecurity resource given?: N/A  Living Arrangements: Lives Alone  Is patient a ?: No    Activities of Daily Living Prior to Admission  Functional Status: Independent  Completes ADLs independently?: Yes  Ambulates independently?: Yes  Does patient use assisted devices?: Yes  Assisted Devices (DME) used: Straight Cane  Does patient currently own DME?: Yes  What DME does the patient currently own?: Straight Cane, Rollator  Does patient have a history of Outpatient Therapy (PT/OT)?: No  Does the patient have a history of Short-Term Rehab?: No  Does patient have a history of HHC?: No  Does patient currently have [address]?: No         Patient Information Continued  Income Source: SSI/SSD  Does patient have prescription coverage?: Yes  Within the past 12 months, you worried that your food would run out before you got the money to buy more : Never true  Within the past 12 months, the food you bought just didn't last and you didn't have money to get more : Never true  Food insecurity resource given?: N/A  Does patient receive dialysis treatments?: No  Does patient have a history of substance abuse?: No  Does patient have a history of Mental Health Diagnosis?: No         Means of Transportation  Means of Transport to Appts[de-identified] Drives Self  In the past 12 months, has lack of transportation kept you from medical appointments or from getting medications?: No  In the past 12 months, has lack of transportation kept you from meetings, work, or from getting things needed for daily living?: No  Was application for public transport provided?: N/A        DISCHARGE DETAILS:    Discharge planning discussed with[de-identified] patient and daughterLety Pronto of Choice: Yes  Comments - [place] of Choice: Becky [facility]  CM contacted family/caregiver?: Yes  Were Treatment Team discharge recommendations reviewed with patient/caregiver?: Yes  Did patient/caregiver verbalize understanding of patient care needs?: Yes  Were patient/caregiver advised of the risks associated with not following Treatment Team discharge recommendations?: Yes    Contacts  Patient Contacts: ravi Broussard  Relationship to Patient[de-identified] Family  Contact Method:  In Person  Reason/Outcome: Continuity of Care, Discharge [address]         Is the patient interested in [facility] at discharge?: Yes  [address] requested[de-identified] Physical Therapy, Occupational Therapy, Nursing  Home Health Agency Name[de-identified] [address] [facility] Provider[de-identified] PCP  Home Health Services Needed[de-identified] Evaluate Functional Status and Safety, Gait/ADL Training, Strengthening/Theraputic Exercises to Improve Function, Other (comment) (Medication Management)  Homebound Criteria Met[de-identified] Uses an Assist Device (i e  cane, walker, etc)  Supporting Clincal Findings[de-identified] Limited Endurance    DME Referral Provided  Referral made for DME?: Yes  DME referral completed for the following items[de-identified] Fabiola [place]  DME Supplier Name[de-identified] [employer]    Other Referral/Resources/Interventions Provided:  Interventions: [facility]  Referral Comments: Becky         Treatment Team Recommendation: Home with  Place [facility]  Discharge Destination Plan[de-identified] Home with [place] at Discharge : Family            CM met with patient and daughter Bette Vyas at the bedside, baseline information was obtained  CM discussed the role of CM in helping the patient develop a discharge plan and assist the patient in carry out their plan  Patient lives independently in 2 story home, uses cane and owns rollator but liked walking with RW with therapy  CM submitted [place] referral for Veron[facility] for patient  CM discussed with patient therapy recommendation for [facility]  Patient reluctant but agreebale to referral to [facility] only  A post acute care recommendation was made by your care team for [facility]  Discussed Freedom of Choice with patient  Offered patient blanket referral for agencies via in person  patient aware the list is custom by insurance and patient's medical needs  CM submitted [facility] referral for [facility]       CM submitted [place] request for [facility]on[facility]

## 2022-12-28 NOTE — PHYSICAL THERAPY NOTE
PHYSICAL THERAPY EVALUATION  NAME:  Yaima Baltazar  DATE: 12/28/22    AGE:   [de-identified] y o  Mrn:   [id number]  ADMIT DX:  Rhabdomyolysis [M62 82]  Pain [R52]  SIRS (systemic inflammatory response syndrome) (HCC) [R65 10]  Atrial fibrillation with RVR (HCC) [I48 91]  Calculus of gallbladder without cholecystitis without obstruction [K80 20]  Leukocytosis, unspecified type [D72 829]    Past Medical History:   Diagnosis Date   • Atrial fibrillation ([facility] [address] )    • Breast cancer in female [facility])      Length Of Stay: 1  Performed at least 2 patient identifiers during session: Name and Birthday  PHYSICAL THERAPY EVALUATION :    12/28/22 1049   PT Last Visit   PT Visit Date 12/28/22   Note Type   Note type Evaluation   Pain Assessment   Pain Assessment Tool FLACC   Pain Location/Orientation Orientation: Left; Location: Arm  (flank)   Pain Rating: FLACC (Rest) - Face 0   Pain Rating: FLACC (Rest) - Legs 0   Pain Rating: FLACC (Rest) - Activity 0   Pain Rating: FLACC (Rest) - Cry 0   Pain Rating: FLACC (Rest) - Consolability 0   Score: FLACC (Rest) 0   Pain Rating: FLACC (Activity) - Face 1   Pain Rating: FLACC (Activity) - Legs 0   Pain Rating: FLACC (Activity) - Activity 0   Pain Rating: FLACC (Activity) - Cry 1   Pain Rating: FLACC (Activity) - Consolability 0   Score: FLACC (Activity) 2   Restrictions/Precautions   Other Precautions Bed Alarm; Fall Risk;Pain;Multiple lines;Telemetry   Home Living   Type of Home House  (4 R HR +4 L HR to enter home)   Home Layout Two level;Bed/bath upstairs  (15 steps R HR, but doesn have a chair lift)   Bathroom Shower/Tub Tub/shower unit   Bathroom Toilet Standard   Bathroom Equipment Shower chair;Grab bars in [address]  (has rollator-doesn't use-brand new from a friend)   Additional Comments Reports living in a 2 SH with 4+4 MIKIE and FF to 2nd floor  Reports using cane for mobility   Prior Function   Level of [place] Independent with ADLs; Independent with functional mobility; Independent with IADLS   Lives With Alone  (4 cats)   Receives Help From Family   IADLs Independent with driving; Independent with medication management; Independent with meal prep   Falls in the last 6 months 1 to 4   Comments Reports being independent with mobility, ADLs and IADLs  Reports using spc for mobility  Cognition   Orientation Level Oriented X4   Following Commands Follows one step commands without difficulty   Subjective   Subjective "I have a different color cane for each outfit  I have about 15 canes at home  RLE Assessment   RLE Assessment WFL  (4/5)   LLE Assessment   LLE Assessment WFL  (4/5)   Coordination   Rapid Alternating Movements Intact   Light Touch   RLE Light Touch Grossly intact   LLE Light Touch Grossly intact   Bed Mobility   Supine to Sit 5  Supervision   Additional items Increased time required;Verbal cues   Additional Comments HOB flat without bedrails  inc time with min cues for technique   Transfers   Sit to Stand   (SBA)   Additional items Increased time required;Verbal cues   Stand to Sit   (SBA)   Additional items Increased time required;Verbal cues   Stand pivot   (steadying assistance-->SBA)   Additional items Increased time required;Verbal cues   Additional Comments use of spc  inc time to achieve standing  wide ADAM  spt without AD with steayding assistance with inc time with min cues for turning completely prior to sitting  pt guarded  Ambulation/Elevation   Gait pattern Wide ADAM; Short stride; Excessively slow  (guarded initially)   Gait Assistance   (steadying assistance--->SBA)   Additional items Assist x 1;Verbal cues   Assistive Device Straight cane   Distance ambulated 90'x2 with spc with steadying assistance progressing to stand by assistance with slow david, decreased step length, cues for increased step length and foot clearance  pt with slight path deviation     Stair Management Assistance   (stand by assistance)   Additional items Increased time required   Stair Management Technique One rail L;With cane; Alternating pattern   Number of Stairs 5   Balance   Static Sitting Normal   Dynamic Sitting Good   Static Standing Fair +   Dynamic Standing Fair   Ambulatory Fair -   Endurance Deficit   Endurance Deficit Yes   Endurance Deficit Description HR elevated from 120s to 150s wiht activity  min BERG  Activity Tolerance   Activity Tolerance Patient limited by fatigue;Patient limited by pain   Medical Staff Made Aware Jose MEADOWS   Nurse Made Aware Xiao ZULETA   Assessment   Prognosis Good   Problem List Decreased strength;Decreased endurance; Impaired balance;Decreased mobility; Decreased safety awareness; Obesity;Pain   Barriers to Discharge Decreased caregiver support   Barriers to Discharge Comments lives alone   Goals   Patient Goals "Go home"   STG Expiration Date 01/11/23   PT Treatment Day 1   Plan   Treatment/Interventions ADL retraining;Functional transfer training;LE strengthening/ROM; Elevations; Therapeutic exercise; Endurance training;Patient/family training;Equipment eval/education; Bed mobility;Gait training; Compensatory technique education;Spoke to nursing;Spoke to case management;OT   PT Frequency 3-5x/wk   Recommendation   PT Discharge Recommendation Home with home health rehabilitation   Equipment Recommended [facility] Recommended Wheeled walker   Change/add to ididwork?  Yes, Change Size   Walker Size [place] (Ht <5'1")   Additional Comments increased support from family recommended   [address] Mobility Inpatient   Turning in Bed Without Bedrails 3   Lying on Back to Sitting on Edge of Flat Bed 3   Moving Bed to Chair 3   Standing Up From Chair 3   Walk in Room 3   Climb 3-5 Stairs 3   Basic Mobility Inpatient Raw Score 18   Basic Mobility Standardized Score 41 05   Highest Level Of Mobility   JH-HLM Goal 6: Walk 10 steps or more   JH-HLM Achieved 8: Walk 250 feet ot more   Additional Treatment Session   Start Time 1102   End Time 1110   Treatment Assessment Pt tolerated session fairly well  She was able to ambulate with decreased assistance and complete transfers wiht decreased assistance with use of RW compared to spc  She reports fatigue after associated with elevated HR  She is limited by decreased strength, balance, endurance  She will continue to benefit from PT services to maximize LOF  Equipment Use initiated use of RW  min cues for hand placement for safety wiht use of RW  sit<>stand with RW with supervision  spt with RW with supervision wiht min cues for turning completely prior to sitting  ambulated 80'x1 with RW with Supervision with slow david with min cues for inc step length and min cues to stay within ADAM of RW especially when turning  End of Consult   Patient Position at End of Consult Bedside chair; All needs within reach     (Please find full objective findings from PT assessment regarding body systems outlined above)  Assessment: Pt is a [de-identified] y o  female seen for PT evaluation s/p admission to [address] [place] [address] on 12/27/2022 with Fall at home, initial encounter  Order placed for PT services  Upon evaluation: Pt is presenting with impaired functional mobility due to pain, decreased strength, decreased endurance, impaired balance, gait deviations, decreased safety awareness, fall risk, and LE edema requiring  supervision assistance for bed mobility, stand by to steadying assistance for transfers, and steadying to stand by assistance for ambulation with spc    Pt's clinical presentation is currently unpredictable given the functional mobility deficits above, especially weakness, edema of extremities, decreased endurance, gait deviations, pain, decreased activity tolerance, decreased functional mobility tolerance, decreased safety awareness, and SOB upon exertion, coupled with fall risks as indicated by AM-PAC 6-Clicks: 69/73 as well as hx of falls, impaired balance, polypharmacy, and decreased safety awareness and combined with medical complications of tachycardia, pain impacting overall mobility status, abnormal WBCs, need for input for mobility technique/safety and abnormal lactic acid, abnormal CK and CKMB with non-traumatic rhabdomyolysis, Afib with RVR  Pt's PMHx and comorbidities that may affect physical performance and progress include: A fib, obesity and breast CA  Personal factors affecting pt at time of IE include: step(s) to enter environment, multi-level environment, inability to perform IADLs, inability to perform ADLs, inability to navigate level surfaces without external assistance, inability to ambulate household distances and recent fall(s)/fall history  Pt will benefit from continued skilled PT services to address deficits as defined above and to maximize level of functional mobility to facilitate return toward PLOF and improved QOL  From PT/mobility standpoint, recommendation at time of d/c would be Home PT, home with family support and with walker pending progress in order to reduce fall risk and maximize pt's functional independence and consistency with mobility in order to facilitate return to PLOF  Recommend trial with walker next 1-2 sessions, trial with cane next 1-2 sessions and ther ex next 1-2 sessions  The patient's AM-PAC Basic Mobility Inpatient Short Form Raw Score is 18  A Raw score of greater than 16 suggests the patient may benefit from discharge to home  Please also refer to the recommendation of the Physical Therapist for safe discharge planning  Goals: Pt will: Perform bed mobility tasks with modified I to reposition in bed and prepare for transfers  Pt will perform transfers with modified I to decrease burden of care and decrease risk for falls and prepare for ambulation  Pt will ambulate with LRAD for >/= 200' with  modified I  to decrease burden of care, decrease risk for falls, improve activity tolerance and improve gait quality and to access home environment  Pt will complete >/= 15 steps with with unilateral handrail with modified I to return to home with MIKIE, return to multilevel home, decrease risk for falls and improve activity tolerance  Pt will participate in objective balance assessment to determine baseline fall risk  Pt will increase B LE strength >/= 1/2 MMT grade to facilitate functional mobility  Pt will participate in SSWS to facilitate discharge planning and independence       Juanita Keane, PT,DPT

## 2022-12-28 NOTE — ASSESSMENT & PLAN NOTE
Lactic acidosis of 3 5 noted secondary to dehydration and rhabdomyolysis  placed on IV fluids  Also noted to have leukocytosis secondary to dehydration but no evidence of infection at this time

## 2022-12-28 NOTE — ASSESSMENT & PLAN NOTE
fell at home and was on the ground for almost 30 hours    Noted to have CK total of 1293   placed on IV fluid hydration and recheck CK daily  renall function is normal so far

## 2022-12-28 NOTE — ASSESSMENT & PLAN NOTE
Lactic acidosis of 3 5 noted secondary to dehydration and rhabdomyolysis poa  placed on IV fluids  Also noted to have leukocytosis secondary to dehydration but no evidence of infection at this time

## 2022-12-28 NOTE — ASSESSMENT & PLAN NOTE
Presented with A  fib with RVR with heart rates in the 170s secondary to not taking her metoprolol for 30 hours    Start home dose of metoprolol and placed on as needed IV Lopressor in case heart rate more than 120  Restart Eliquis from tomorrow morning after reviewing CBC

## 2022-12-28 NOTE — ASSESSMENT & PLAN NOTE
fell at home and was on the ground for almost 30 hours    Noted to have CK total of 1293   placed on IV fluid hydration and recheck CK in the morning  renall function is normal so far

## 2022-12-28 NOTE — OCCUPATIONAL THERAPY NOTE
Occupational Therapy Evaluation     Patient Name: Iman Wallace  [id number] Date: 12/28/2022  Problem List  Principal Problem:    Fall at home, initial encounter  Active Problems:    Non-traumatic rhabdomyolysis    Atrial fibrillation with RVR ([facility] [address] )    Lactic acidosis    Morbid obesity ([facility] [address] )    Past Medical History  Past Medical History:   Diagnosis Date    Atrial fibrillation ([facility] [address] )     Breast cancer in female [facility])      Past Surgical History  Past Surgical History:   Procedure Laterality Date    BREAST SURGERY           12/28/22 1050   Note Type   Note type Evaluation   Pain Assessment   Pain Assessment Tool FLACC   Pain Location/Orientation Orientation: Left; Location: Arm  (L flank)   Pain Rating: FLACC (Rest) - Face 0   Pain Rating: FLACC (Rest) - Legs 0   Pain Rating: FLACC (Rest) - Activity 0   Pain Rating: FLACC (Rest) - Cry 0   Pain Rating: FLACC (Rest) - Consolability 0   Score: FLACC (Rest) 0   Pain Rating: FLACC (Activity) - Face 1   Pain Rating: FLACC (Activity) - Legs 0   Pain Rating: FLACC (Activity) - Activity 0   Pain Rating: FLACC (Activity) - Cry 1   Pain Rating: FLACC (Activity) - Consolability 0   Score: FLACC (Activity) 2   Restrictions/Precautions   Other Precautions Bed Alarm; Fall Risk;Multiple lines   Home Living   Type of Home House  (4 + 4 MIKIE 1 HR)   Home Layout Two level;Bed/bath upstairs  (15 steps R HR  Pt reports she does have a stair lift if needed)   Bathroom Shower/Tub Tub/shower unit   Bathroom Toilet Standard   Bathroom Equipment Grab bars in shower; Shower chair   [address] [facility]  (uses [address]  has rollator but does not use)   Additional Comments Pt reports living in a 2 story home with 4+4 MIKIE  Pt ambulates with SPC at baseline   Prior Function   Level of [place] Independent with ADLs; Independent with functional mobility; Independent with IADLS   Lives With Alone  (4 cats)   Receives Help From Family   IADLs Independent with driving; Independent with medication management; Independent with meal prep   Falls in the last 6 months 1 to 4   Comments Pt reports completing ADLs, IADLs, functional ambulation and community mobility + driving @ I  Pt has asistance from family for heavy IADLs PRN   ADL   Where Assessed Edge of bed   Grooming Assistance 6  Modified Independent   Grooming Deficit Setup   UB Dressing Assistance 6  Modified independent   UB Dressing Deficit Setup   LB Dressing Assistance   (SBA)   LB Dressing Deficit Use of adaptive equipment; Don/doff L sock; Don/doff R sock; Supervision/safety;Verbal cueing; Increased time to complete; Requires assistive device for steadying   Additional Comments Pt completing ADL tasks while seated at EOB,  UBD ressing and grooming tasks @ Mod I after set-up  LB Dressing @ SBA for balance/safety while completing CM around waist  Pt reports utilizing a aock aide for donning socks at home  Pt verbalizes no concerns at this time  Bed Mobility   Supine to Sit 5  Supervision   Additional items Increased time required;Verbal cues   Additional Comments HOB flat, no bedrails  increased time PRN   Transfers   Sit to Stand 5  Supervision   Additional items Increased time required;Verbal cues   Stand to Sit 5  Supervision   Additional items Increased time required;Verbal cues   Stand pivot 5  Supervision   Additional items Increased time required;Verbal cues   Additional Comments Pt completing functional transfers with use of RW For UB support  S for STS and SPT wiht increased time and occasional cues for RW managmenet  no LOB noted   Pt reports "this is much easier" (regarding use of RW)   Balance   Static Sitting Normal   Dynamic Sitting Good   Static Standing Fair +   Dynamic Standing Fair   Activity Tolerance   Activity Tolerance Patient limited by fatigue;Patient limited by pain   Medical Staff Made Aware Spoke with PT, Darren Chambers   Nurse Made Aware Spoke with RN   RUE Assessment   RUE Assessment WFL   LUE Assessment   LUE Assessment WFL   Hand Function   Gross Motor Coordination Functional   Fine Motor Coordination Functional   Sensation   Light Touch No apparent deficits   Cognition   Overall Cognitive Status WFL   Arousal/Participation Alert; Responsive; Cooperative   Attention Attends with cues to redirect   Orientation Level Oriented X4   Memory Within functional limits   Following Commands Follows all commands and directions without difficulty   Assessment   Limitation Decreased ADL status; Decreased UE strength;Decreased endurance;Decreased self-care trans;Decreased high-level ADLs   Prognosis Good   Assessment Pt is a [de-identified] y o  female, admitted to [address] [place] [address] 12/27/2022 d/t experiencing a fall at home and remaining on floor for +26hours  Dx: fall  Pt with PMHx impacting their performance during ADL tasks, including: a-fib, breast cancer  Prior to admission to the hospital Pt was performing ADLs without physical assistance  IADLs without physical assistance  Functional transfers/ambulation without physical assistance  Cognitive status was PTA was intact  OT order placed to assess Pt's ADLs, cognitive status, and performance during functional tasks in order to maximize safety and independence while making most appropriate d/c recommendations  Pt's clinical presentation is currently unstable/unpredictable given new onset deficits that effect Pt's occupational performance and ability to safely return to [facility] including decrease activity tolerance, decrease standing balance, decrease performance during ADL tasks, decrease safety awareness , decrease UB MS, decrease generalized strength, decrease activity engagement, decrease performance during functional transfers and high fall risk combined with medical complications of hypertension , tachycardia, pain impacting overall mobility status, A-fib, abnormal CBC, increased RR, decreased skin integrity and need for input for mobility technique/safety   Pt with increased HR during activity ranging from 125-155bpm  RN Xiao aware  Personal factors affecting Pt at time of initial evaluation include: step(s) to enter environment, multi-level environment, availability as recommended, limited home support, inability to perform current job functions, inability to perform IADLs, new need for AD, inability to navigate community distances, decreased initiation and engagement and recent fall(s)/fall history  Pt will benefit from continued skilled OT services to address deficits as defined above and to maximize level independence/participation during ADLs and functional tasks to facilitate return toward PLOF and improved quality of life  From an occupational therapy standpoint, recommendation at time of d/c would be HHOT  Plan   Treatment Interventions ADL retraining;Functional transfer training;UE strengthening/ROM; Endurance training;Equipment evaluation/education; Compensatory technique education;Continued evaluation; Energy conservation; Activityengagement   Goal Expiration Date 01/11/23   OT Frequency 1-2x/wk   Recommendation   OT Discharge Recommendation Home with home health rehabilitation   [facility] Daily Activity Inpatient   Lower Body Dressing 3   Bathing 3   Toileting 3   Upper Body Dressing 3   Grooming 3   Eating 4   Daily Activity Raw Score 19   Daily Activity Standardized Score (Calc for Raw Score >=11) 40 22   [facility] Applied Cognition Inpatient   Following a Speech/Presentation 4   Understanding Ordinary Conversation 4   Taking Medications 4   Remembering Where Things Are Placed or Put Away 4   Remembering List of 4-5 Errands 4   Taking Care of Complicated Tasks 4   Applied Cognition Raw Score 24   Applied Cognition Standardized Score 62 21     The patient's raw score on the AM-PAC Daily Activity inpatient short form is 19, standardized score is 40 22, greater than 39 4  Patients at this level are likely to benefit from DC to home  Please refer to the recommendation of the Occupational Therapist for safe DC planning      Pt goals to be met by 1/11/2023    Pt will demonstrate ability to complete LB dressing @ Mod I in order to increase safety and independence during meaningful tasks  Pt will demonstrate ability to malu/doff socks/shoes while sitting EOB @ Mod I in order to increase safety and independence during meaningful tasks  Pt will demonstrate ability to complete toileting tasks including CM and pericare @ Mod I in order to increase safety and independence during meaningful tasks  Pt will demonstrate ability to complete EOB, chair, toilet/commode transfers @ Mod I in order to increase performance and participation during functional tasks  Pt will demonstrate ability to stand for 10+ minutes while maintaining G balance with use of least restrictive device for UB support PRN  Pt will demonstrate ability to tolerate 30-35 minute OT session with no vc'ing for deep breathing or use of energy conservation techniques in order to increase activity tolerance during functional tasks  Pt will demonstrate Good carryover of use of energy conservation/compensatory strategies during ADLs and functional tasks in order to increase safety and reduce risk for falls  Pt will demonstrate Good attention and participation in continued evaluation of functional ambulation house hold distances in order to assist with safe d/c planning  Pt will attend to continued cognitive assessments 100% of the time in order to provide most appropriate d/c recommendations  Pt will follow 100% simple 2-step commands and be A&O x4 consistently with environmental cues to increase participation in functional activities  Pt will identify 3 areas of interest/hobbies and 1 intervention on how to incorporate into daily life in order to increase interaction with environment and peers as well as increase participation in meaningful tasks     Pt will demonstrate 100% carryover of BUE HEP in order to increase BUE MS and increase performance during functional tasks upon d/c home     Yolanda Litter OTR/L

## 2022-12-28 NOTE — UTILIZATION REVIEW
Initial Clinical Review    Admission: Date/Time/Statement:   Admission Orders (From admission, onward)     Ordered        12/27/22 1855  INPATIENT ADMISSION  Once                      Orders Placed This Encounter   Procedures   • INPATIENT ADMISSION     Standing Status:   Standing     Number of Occurrences:   1     Order Specific Question:   Level of Care     Answer:   Med Surg [16]     Order Specific Question:   Estimated length of stay     Answer:   More than 2 Midnights     Order Specific Question:   Certification     Answer:   I certify that inpatient services are medically necessary for this patient for a duration of greater than two midnights  See H&P and MD Progress Notes for additional information about the patient's course of treatment  ED Arrival Information     Expected   -    Arrival   12/27/2022 17:28    Acuity   Emergent            Means of arrival   Ambulance    Escorted by   [facility]   Hospitalist    Admission type   Emergency            Arrival complaint   fall           Chief Complaint   Patient presents with   • Fall     Pt tripped & fell 12/26 @ 0900  Did not hit head, denies LOC  Pt was on the floor for 26 hours  Pt's only complaint is left rib pain  Pt A&Ox4  Initial Presentation: [de-identified] y o  female with PMhx of morbid obesity, A-fib presents to ED by ems s/p fall at home, was on floor for ~ 30 hrs as she was unable to stand up d/t generalized weakness  States she hit her L side of her breast and has some bruising on her legs  Has not eaten or taken or meds in last 30 hrs  Called ems after reaching the phone and calling 911  In ED tachycardic, bruising present  WBC 24 08, Hgb 16 1, Hct 49 6  LA 3 5  CK 1,293  CTH with no acute abnl  C-spine with no fx, (+) mod-severe degenerative changes most pronounced at C4-C7  Admit inpatient to M/S/Tele unit with Rhabdomyolysis, lactic acidosis s/p fall -- IV fluids  Hold Eliquis tonight, restart tomorrow  Labs in AM  Trend LA  SCD's  PT OT evals  Date: 12/28   Day 2: -- Rates are still elevated in the 120s to 140s, did require IV Lopressor and IV Cardizem boluses overnight  And IV Lopressor this morning as well  Increase metoprolol to 75 mg twice daily, 1 additional dose of IV Cardizem 15 mg ordered  If pt continues to have elevated HRs might require Cardizem drip  Cardiology consulted  Continue Eliquis for anticoagulation  Recheck CK daily  Continue supportive care  Cardiology consult -- Afib with rvr- pt with known chronic afib on Eliquis, did not get BB for 30 hours and likely rates elevated from dehydration and rhabdo  Plan: no indication for echo at this time  Recommend gentle IV hydration  Can up titrate BB as BP allows although hydration is likely to improve overall HR  Continue Eliquis at present dose  No further changes in regimen at this time           ED Triage Vitals   Temperature Pulse Respirations Blood Pressure SpO2   12/27/22 1735 12/27/22 1735 12/27/22 1735 12/27/22 1735 12/27/22 1735   98 8 °F (37 1 °C) (!) 121 18 121/69 96 %      Temp Source Heart Rate Source Patient Position - Orthostatic VS BP Location FiO2 (%)   12/27/22 1735 12/27/22 1735 12/27/22 1735 12/27/22 2025 --   Oral Monitor Lying Left arm       Pain Score       12/27/22 2225       No Pain          Wt Readings from Last 1 Encounters:   12/28/22 100 kg (220 lb 7 4 oz)     Additional Vital Signs:   Date/Time Temp Pulse Resp BP MAP (mmHg) SpO2 O2 Device Patient Position - Orthostatic VS   12/28/22 1149 -- 136 Abnormal  -- 118/79 -- -- -- --   12/28/22 1142 98 2 °F (36 8 °C) 122 Abnormal  18 118/79 88 93 % -- Sitting   12/28/22 0716 -- 124 Abnormal  18 120/64 -- -- -- --   12/28/22 0332 -- 71 16 115/78 83 -- -- Lying   12/27/22 2347 -- 120 Abnormal  16 137/70 87 -- -- --   12/27/22 2025 98 1 °F (36 7 °C) 114 Abnormal  18 151/92 118 94 % -- Lying   12/27/22 1905 -- 122 Abnormal  22 183/106 Abnormal  -- 97 % -- --   12/27/22 1835 -- 88 27 Abnormal  162/70 -- 97 % None (Room air) --   12/27/22 1820 -- 137 Abnormal  26 Abnormal  164/71 -- 95 % None (Room air) --   12/27/22 1805 -- 149 Abnormal  24 Abnormal  134/101 Abnormal  -- 95 % None (Room air) Lying   12/27/22 1750 -- 120 Abnormal  14 206/84 Abnormal  -- 98 % None (Room air) Lying   12/27/22 1735 98 8 °F (37 1 °C) 121 Abnormal  18 121/69 -- 96 % None (Room air) Lying     Pertinent Labs/Diagnostic Test Results:   TRAUMA - CT head wo contrast   Final Result by Leonie Banuelos MD (12/27 1810)      No acute intracranial pathology  In particular, no intracranial hemorrhage or calvarial fracture  TRAUMA - CT spine cervical wo contrast   Final Result by Leonie Banuelos MD (12/27 1814)      No cervical spine fracture or traumatic malalignment  Moderate to severe degenerative changes most pronounced at C4-C7  TRAUMA - CT chest abdomen pelvis w contrast   Final Result by Leonie Banuelos MD (12/27 1836)      No evidence of acute thoracic, abdominal, or pelvic trauma  Endometrial stripe thickened up to 6 mm  Recommend further evaluation with nonemergent pelvic ultrasound  This may be related to endometrial hyperplasia, an endometrial polyp or possibly neoplasm  1 6 x 1 4 cm somewhat irregular lesion in the inner lower right breast with bulky calcifications  This may be related to postsurgical changes from prior lumpectomy  Recommend correlation with prior breast imaging  Multiple additional incidental findings as above  XR Trauma chest portable   ED Interpretation by Mathieu Gustafson MD (12/27 6259)   No pneumothorax      Final Result by Leonie Banuelos MD (12/27 7597)      No acute pulmonary pathology  XR Trauma pelvis ap only 1 or 2 vw   ED Interpretation by Mathieu Gustafson MD (12/27 7980)   No fracture      Final Result by Leonie Banuelos MD (12/27 3726)      No definite fracture or hip dislocation          Results from last 7 days   Lab Units 12/27/22  1748   SARS-COV-2  Negative     Results from last 7 days   Lab Units 12/28/22  0514 12/27/22  1743   WBC Thousand/uL 22 13* 24 08*   HEMOGLOBIN g/dL 13 7 16 1*   HEMATOCRIT % 42 8 49 6*   PLATELETS Thousands/uL 195 243   NEUTROS ABS Thousands/µL  --  20 44*     Results from last 7 days   Lab Units 12/28/22  0514 12/27/22  1743   SODIUM mmol/L 143 141   POTASSIUM mmol/L 3 9 4 1   CHLORIDE mmol/L 107 102   CO2 mmol/L 22 24   ANION GAP mmol/L 14* 15*   BUN mg/dL 16 18   CREATININE mg/dL 0 77 0 95   EGFR ml/min/1 73sq m 73 56   CALCIUM mg/dL 8 1* 9 2     Results from last 7 days   Lab Units 12/28/22  0514 12/27/22  1743   AST U/L 35 53*   ALT U/L 23 29   ALK PHOS U/L 39* 49   TOTAL PROTEIN g/dL 6 0* 7 6   ALBUMIN g/dL 2 8* 3 7   TOTAL BILIRUBIN mg/dL 3 31* 3 55*     Results from last 7 days   Lab Units 12/28/22  0514 12/27/22  1743   GLUCOSE RANDOM mg/dL 102 118     Results from last 7 days   Lab Units 12/27/22  1743   CK TOTAL U/L 1,293*   CK MB INDEX % <1 0   CK MB ng/mL 7 7*     Results from last 7 days   Lab Units 12/27/22  2341 12/27/22  2141 12/27/22  1823   HS TNI 0HR ng/L  --   --  14   HS TNI 2HR ng/L  --  14  --    HSTNI D2 ng/L  --  0  --    HS TNI 4HR ng/L 14  --   --    HSTNI D4 ng/L 0  --   --      Results from last 7 days   Lab Units 12/27/22  1743   PROTIME seconds 15 4*   INR  1 21*   PTT seconds 24     Results from last 7 days   Lab Units 12/28/22  0514   TSH 3RD GENERATON uIU/mL 1 335     Results from last 7 days   Lab Units 12/27/22  1949 12/27/22  1752   LACTIC ACID mmol/L 1 8 3 5*     Results from last 7 days   Lab Units 12/27/22  1748   INFLUENZA A PCR  Negative   INFLUENZA B PCR  Negative   RSV PCR  Negative     Results from last 7 days   Lab Units 12/27/22  1816 12/27/22  1750   BLOOD CULTURE  Received in Microbiology Lab  Culture in Progress  Received in Microbiology Lab  Culture in Progress         ED Treatment:   Medication Administration from 12/27/2022 1728 to 12/27/2022 2052 Date/Time Order Dose Route Action     12/27/2022 1748 EST sodium chloride 0 9 % bolus 1,000 mL 1,000 mL Intravenous New Bag     12/27/2022 1903 EST sodium chloride 0 9 % bolus 1,000 mL 1,000 mL Intravenous New Bag     12/27/2022 1818 EST metoprolol (LOPRESSOR) injection 5 mg 5 mg Intravenous Given     12/27/2022 1820 EST diltiazem (CARDIZEM) injection 15 mg 15 mg Intravenous Given     12/27/2022 1948 EST metoprolol tartrate (LOPRESSOR) tablet 50 mg 50 mg Oral Given     12/27/2022 1948 EST docusate sodium (COLACE) capsule 100 mg 100 mg Oral Given     Past Medical History:   Diagnosis Date   • Atrial fibrillation ([facility] [address] )    • Breast cancer in female [facility])      Admitting Diagnosis: Rhabdomyolysis [M62 82]  Pain [R52]  SIRS (systemic inflammatory response syndrome) (HCC) [R65 10]  Atrial fibrillation with RVR (HCC) [I48 91]  Calculus of gallbladder without cholecystitis without obstruction [K80 20]  Leukocytosis, unspecified type [D72 829]  Age/Sex: [de-identified] y o  female  Admission Orders:  Scheduled Medications:  apixaban, 5 mg, Oral, BID  docusate sodium, 100 mg, Oral, BID  metoprolol tartrate, 75 mg, Oral, BID    PRN Meds:  acetaminophen, 650 mg, Oral, Q6H PRN  metoprolol, 5 mg, Intravenous, Q6H PRN 12/28 x2  ondansetron, 4 mg, Intravenous, Q6H PRN        Network Utilization Review Department  ATTENTION: Please call with any questions or concerns to [phone] and carefully listen to the prompts so that you are directed to the right person  All voicemails are confidential   Helga Reilly all requests for admission clinical reviews, approved or denied determinations and any other requests to dedicated fax number below belonging to the campus where the patient is receiving treatment   List of dedicated fax numbers for the Facilities:  [address] DENIALS (Administrative/Medical Necessity) [phone]   [address] [place] [address] (Maternity/NICU/Pediatrics) Anahi Callahan 172 [address] [facility]bar[facility] 77 [phone]   [address] [place] [address] Christa GillilandProv[facility] Medical Cent[facility]  [phone]   [address] Cristino Junior Mak[place] 134 815 [facility] [phone]

## 2022-12-28 NOTE — H&P
[facility] 1940, [de-identified] y o  female MRN: [id number]  Unit/Bed#: ED 03 Encounter: [id number]  Primary Care Provider: Heather Schaefer DO   Date and time admitted to hospital: 12/27/2022  5:30 PM    * Fall at home, initial encounter  [facility]  She had a mechanical fall at home where she tripped over a rug and was unable to stand up afterwards for almost 30 hours  He did not take any of her medications or eat or drink for that time  Finally was able to call 911 and her family  Will hydrate the patient and get PT OT eval   Fortunately trauma series is negative for any fractures  Bruising noted secondary to being on the floor on her left breast and her legs which do not appear to be too concerning however we will hold Eliquis tonight and restart from tomorrow    Atrial fibrillation with RVR ([facility] [address] )  Assessment & Plan  Presented with A  fib with RVR with heart rates in the 170s secondary to not taking her metoprolol for 30 hours  Start home dose of metoprolol and placed on as needed IV Lopressor in case heart rate more than 120  Restart Eliquis from tomorrow morning after reviewing CBC    Non-traumatic rhabdomyolysis  Assessment & Plan  fell at home and was on the ground for almost 30 hours  Noted to have CK total of 1293   placed on IV fluid hydration and recheck CK in the morning  renall function is normal so far    Morbid obesity ([address] )  Assessment & Plan  BMI is 41 2  Will  on diet exercise and left some modification    Lactic acidosis  Assessment & Plan  Lactic acidosis of 3 5 noted secondary to dehydration and rhabdomyolysis  placed on IV fluids and recheck in the morning  Also noted to have leukocytosis secondary to dehydration but no evidence of infection at this time        VTE Prophylaxis: Apixaban (Eliquis)  / sequential compression device   Code Status: Full code  POLST: There is no POLST form on file for this patient (pre-hospital)  Discussion with family: Discussed with daughter at bedside    Anticipated Length of Stay:  Patient will be admitted on an Inpatient basis with an anticipated length of stay of more than 2 midnights  Justification for Hospital Stay: Fall initial encounter    Total Time for Visit, including Counseling / Coordination of Care: 60 minutes  Greater than 50% of this total time spent on direct patient counseling and coordination of care  Chief Complaint: Fall at home    History of Present Illness:    Ngozi Cano is a [de-identified] y o  female who presents with fell at home and was on the floor for 30 hours as she was unable to stand up due to generalized weakness  Patient states that she hit the left side of her breast and has some bruising on her legs otherwise does not complain of any pain but states that she felt very weak and stiff  She did not eat or drink or take any of her medications for 30 hours and came to the ED after finally reaching the phone and calling 911  Found to be in A  fib with RVR  Review of Systems:    Review of Systems   Constitutional: Positive for appetite change and fatigue  Negative for chills and fever  HENT: Negative for hearing loss, sore throat and trouble swallowing  Eyes: Negative for photophobia, discharge and visual disturbance  Respiratory: Negative for chest tightness and shortness of breath  Cardiovascular: Negative for chest pain and palpitations  Gastrointestinal: Negative for abdominal pain, blood in stool and vomiting  Endocrine: Negative for polydipsia and polyuria  Genitourinary: Negative for difficulty urinating, dysuria, flank pain and hematuria  Musculoskeletal: Positive for arthralgias and gait problem  Negative for back pain  Skin: Negative for rash  Allergic/Immunologic: Negative for environmental allergies and food allergies  Neurological: Positive for weakness  Negative for dizziness, seizures, syncope and headaches  Hematological: Does not bruise/bleed easily  Psychiatric/Behavioral: Negative for behavioral problems  All other systems reviewed and are negative  Past Medical and Surgical History:     Past Medical History:   Diagnosis Date   • Atrial fibrillation ([address] )    • Breast cancer in female [facility])        Past Surgical History:   Procedure Laterality Date   • BREAST SURGERY         Meds/Allergies:    Prior to Admission medications    Medication Sig Start Date End Date Taking? Authorizing Provider   apixaban (ELIQUIS) 5 mg Take 5 mg by mouth 2 (two) times a day 10/17/22  Yes Historical Provider, MD   metoprolol tartrate (LOPRESSOR) 50 mg tablet Take 50 mg by mouth 2 (two) times a day 4/5/22 4/5/23 Yes Historical Provider, MD     I have reviewed home medications with patient personally  Allergies: Allergies   Allergen Reactions   • Penicillins Anaphylaxis       Social History:     Marital Status: Single     Social History     Substance and Sexual Activity   Alcohol Use Never     Social History     Tobacco Use   Smoking Status Former   • Types: Cigarettes   Smokeless Tobacco Never     Social History     Substance and Sexual Activity   Drug Use Never       Family History:    Family History   Problem Relation Age of Onset   • Arthritis Mother        Physical Exam:     Vitals:   Blood Pressure: 162/70 (12/27/22 1835)  Pulse: 88 (12/27/22 1835)  Temperature: 98 8 °F (37 1 °C) (12/27/22 1735)  Temp Source: Oral (12/27/22 1735)  Respirations: (!) 27 (12/27/22 1835)  Height: 5' 1" (154 9 cm) (12/27/22 1740)  Weight - Scale: 98 9 kg (218 lb 0 6 oz) (12/27/22 1740)  SpO2: 97 % (12/27/22 1835)    Physical Exam  Vitals and nursing note reviewed  Constitutional:       Appearance: She is obese  She is ill-appearing  HENT:      Head: Normocephalic and atraumatic  Right Ear: External ear normal       Left Ear: External ear normal       Nose: Nose normal       Mouth/Throat:      Pharynx: Oropharynx is clear     Eyes: Pupils: Pupils are equal, round, and reactive to light  Cardiovascular:      Rate and Rhythm: Tachycardia present  Rhythm irregular  Heart sounds: Normal heart sounds  Pulmonary:      Effort: Pulmonary effort is normal       Breath sounds: Normal breath sounds  Abdominal:      General: Bowel sounds are normal       Palpations: Abdomen is soft  Tenderness: There is no abdominal tenderness  Musculoskeletal:         General: Normal range of motion  Cervical back: Normal range of motion and neck supple  Skin:     General: Skin is warm and dry  Capillary Refill: Capillary refill takes less than 2 seconds  Findings: Bruising present  Neurological:      General: No focal deficit present  Mental Status: She is alert and oriented to person, place, and time  Psychiatric:         Mood and Affect: Mood normal            Additional Data:     Lab Results: I have personally reviewed pertinent reports  Results from last 7 days   Lab Units 12/27/22  1743   WBC Thousand/uL 24 08*   HEMOGLOBIN g/dL 16 1*   HEMATOCRIT % 49 6*   PLATELETS Thousands/uL 243   NEUTROS PCT % 84*   LYMPHS PCT % 5*   MONOS PCT % 8   EOS PCT % 1     Results from last 7 days   Lab Units 12/27/22  1743   SODIUM mmol/L 141   POTASSIUM mmol/L 4 1   CHLORIDE mmol/L 102   CO2 mmol/L 24   BUN mg/dL 18   CREATININE mg/dL 0 95   ANION GAP mmol/L 15*   CALCIUM mg/dL 9 2   ALBUMIN g/dL 3 7   TOTAL BILIRUBIN mg/dL 3 55*   ALK PHOS U/L 49   ALT U/L 29   AST U/L 53*   GLUCOSE RANDOM mg/dL 118     Results from last 7 days   Lab Units 12/27/22  1743   INR  1 21*             Results from last 7 days   Lab Units 12/27/22  1752   LACTIC ACID mmol/L 3 5*       Imaging: I have personally reviewed pertinent reports  TRAUMA - CT head wo contrast   Final Result by Breann Franklin MD (12/27 1810)      No acute intracranial pathology  In particular, no intracranial hemorrhage or calvarial fracture        The study was marked in EPIC for immediate notification  Workstation performed: [id number]         TRAUMA - CT spine cervical wo contrast   Final Result by Nevin Felty, MD (12/27 1814)      No cervical spine fracture or traumatic malalignment  Moderate to severe degenerative changes most pronounced at C4-C7  The study was marked in [facility] for immediate notification  Workstation performed: [id number]         TRAUMA - CT chest abdomen pelvis w contrast   Final Result by Nevin Felty, MD (12/27 6636)      No evidence of acute thoracic, abdominal, or pelvic trauma  Endometrial stripe thickened up to 6 mm  Recommend further evaluation with nonemergent pelvic ultrasound  This may be related to endometrial hyperplasia, an endometrial polyp or possibly neoplasm  1 6 x 1 4 cm somewhat irregular lesion in the inner lower right breast with bulky calcifications  This may be related to postsurgical changes from prior lumpectomy  Recommend correlation with prior breast imaging  Multiple additional incidental findings as above  The study was marked in [facility] for immediate notification  Workstation performed: [id number]         XR Trauma chest portable   ED Interpretation by Leeroy Sosa MD (12/27 4919)   No pneumothorax      Final Result by Nevin Felty, MD (12/27 4209)      No acute pulmonary pathology  Workstation performed: [id number]         XR Trauma pelvis ap only 1 or 2 vw   ED Interpretation by Leeroy Sosa MD (12/27 0103)   No fracture      Final Result by Nevin Felty, MD (12/27 8457)      No definite fracture or hip dislocation  Workstation performed: [id number]             EKG, Pathology, and Other Studies Reviewed on Admission:   · EKG: A  fib with RVR    Allscripts / Epic Records Reviewed: Yes     ** Please Note: This note has been constructed using a voice recognition system   **

## 2022-12-28 NOTE — CONSULTS
Consultation - Cardiology   Iman Wallace [de-identified] y o  female MRN: [id number]  Unit/Bed#: -02 Encounter: [id number]    Assessment/Plan     Assessment:  Mechanical fall at home with inability to ambulate for 30 hours   - CK elevation and evidence of rhabdo   - renal function normal   - currently receiving fluids  Afib with rvr- patient with known chronic afib on Eliquis, did not get BB for 30 hours and likely rates elevated from dehydration and rhabdo  Obesity  Elevated lactate level- normalized  Normal LV function     Plan:  1  No indication for repeat echo at this time  2  Would recommend gentle hydration  Patient is starting to notice swelling  3  Can up titrate BB as BP allows although hydration is likely to improve overall heart rate  4  Continue Eliquis at present dose  Does not meet criteria for reduced dose  5  No further changes in regimen at this time  History of Present Illness   Physician Requesting Consult: Robb Vicente MD  Reason for Consult / Principal Problem: afib with rvr  HPI: Iman Wallace is a [de-identified]y o  year old female with history of afib presented to the hospital for concerns with fall at home due to tripping over a rug  Patient was on the floor for 30 hours as she was unable to stand due to weakness  Trauma series is negative  She had not taken her meds or drank/eaten anything for 30 hours  On admission she was found to be in afib with rvr  She had a lactate on admission and CK level elevated  She was treated with fluids  We were consulted for her afib with rvr  She is currently on Lopressor 75 mg PO BID and has resumed her Eliquis  She remains with fluids at 125 mL/hr  Her lactate is back to normal  Her troponins are negative x 3  She reports in general she is feeling much improved  She states that her legs are starting to feel more tight with fluid since she got here  She reports no sob  She reports her weakness has improved  She has no chest pain    She reports she does not have a cardiologist  She follows only with her PCP  Inpatient consult to Cardiology  Consult performed by: Ros Mueller PA-C  Consult ordered by: Chrissie Peck MD          Review of Systems   Constitutional: Negative for chills, fatigue and unexpected weight change  Respiratory: Negative for chest tightness, shortness of breath and wheezing  Cardiovascular: Positive for leg swelling  Negative for chest pain and palpitations  Gastrointestinal: Negative for nausea  Skin: Negative for color change, pallor and rash  Neurological: Negative for dizziness, weakness and light-headedness  Psychiatric/Behavioral: Negative for agitation, behavioral problems and confusion  Historical Information   Past Medical History:   Diagnosis Date   • Atrial fibrillation ([facility] [address] )    • Breast cancer in female [facility])      Past Surgical History:   Procedure Laterality Date   • BREAST SURGERY       Social History     Substance and Sexual Activity   Alcohol Use Never     Social History     Substance and Sexual Activity   Drug Use Never     E-Cigarette/Vaping     E-Cigarette/Vaping Substances     Social History     Tobacco Use   Smoking Status Former   • Types: Cigarettes   Smokeless Tobacco Never     Family History: non-contributory    Meds/Allergies   all current active meds have been reviewed  Allergies   Allergen Reactions   • Penicillins Anaphylaxis       Objective   Vitals: Blood pressure 120/64, pulse (!) 124, temperature 98 1 °F (36 7 °C), resp  rate 18, height 5' 1" (1 549 m), weight 100 kg (220 lb 7 4 oz), SpO2 94 %    Orthostatic Blood Pressures    Flowsheet Row Most Recent Value   Blood Pressure 120/64 filed at 12/28/2022 6842   Patient Position - Orthostatic VS Lying filed at 12/28/2022 0332            Intake/Output Summary (Last 24 hours) at 12/28/2022 1129  Last data filed at 12/28/2022 0842  Gross per 24 hour   Intake 240 ml   Output --   Net 240 ml       Invasive Devices     Peripheral Intravenous Line Duration           Peripheral IV 12/27/22 Left Antecubital <1 day                Physical Exam  Vitals and nursing note reviewed  Constitutional:       Appearance: Normal appearance  HENT:      Head: Normocephalic and atraumatic  Cardiovascular:      Rate and Rhythm: Rhythm irregular  Heart sounds: No murmur heard  Pulmonary:      Effort: Pulmonary effort is normal       Breath sounds: No wheezing  Abdominal:      Palpations: Abdomen is soft  Musculoskeletal:         General: Swelling present  Cervical back: Neck supple  Skin:     Capillary Refill: Capillary refill takes less than 2 seconds  Neurological:      General: No focal deficit present  Mental Status: She is alert and oriented to person, place, and time  Psychiatric:         Mood and Affect: Mood normal          Thought Content: Thought content normal          Lab Results:   I have personally reviewed pertinent lab results      CBC with diff:   Results from last 7 days   Lab Units 12/28/22  0514   WBC Thousand/uL 22 13*   RBC Million/uL 4 47   HEMOGLOBIN g/dL 13 7   HEMATOCRIT % 42 8   MCV fL 96   MCH pg 30 6   MCHC g/dL 32 0   RDW % 13 2   MPV fL 11 2   PLATELETS Thousands/uL 195     CMP:   Results from last 7 days   Lab Units 12/28/22  0514   SODIUM mmol/L 143   CHLORIDE mmol/L 107   CO2 mmol/L 22   BUN mg/dL 16   CREATININE mg/dL 0 77   CALCIUM mg/dL 8 1*   AST U/L 35   ALT U/L 23   ALK PHOS U/L 39*   EGFR ml/min/1 73sq m 73     HS Troponin:   0   Lab Value Date/Time    HSTNI0 14 12/27/2022 1823    HSTNI2 14 12/27/2022 2141    HSTNI4 14 12/27/2022 2341     BNP:   Results from last 7 days   Lab Units 12/28/22  0514   POTASSIUM mmol/L 3 9   CHLORIDE mmol/L 107   CO2 mmol/L 22   BUN mg/dL 16   CREATININE mg/dL 0 77   CALCIUM mg/dL 8 1*   EGFR ml/min/1 73sq m 73     Coags:   Results from last 7 days   Lab Units 12/27/22  1743   PTT seconds 24   INR  1 21*     TSH:   Results from last 7 days   Lab Units 12/28/22  0514   TSH 3RD JENIFFER uIU/mL 1 335     Magnesium:     Lipid Profile:     Imaging: I have personally reviewed pertinent reports  Echo 9/10/21:  •  Left Ventricle: There is mild concentric hypertrophy  Systolic function   is normal with an ejection fraction of 55-60%  There is grade I (mild)   diastolic dysfunction  •  Left Atrium: Left atrium cavity is moderately dilated  Left atrium   volume index is moderately increased  •  Aortic Valve: The aortic valve is trileaflet  The leaflets are mildly   calcified  There is mild sclerosis  •  Mitral Valve: There is mild annular calcification  There is mild   regurgitation with a centrally directed jet         EKG: Atrial fibrillation with rapid ventricular response  Rightward axis  ST & T wave abnormality, consider inferior ischemia  Abnormal ECG  No previous ECGs available  Confirmed by Pro Sharp ([id number]) on 12/28/2022 8:59:02 AM

## 2022-12-28 NOTE — UTILIZATION REVIEW
NOTIFICATION OF INPATIENT ADMISSION   AUTHORIZATION REQUEST   SERVICING FACILITY:   [address] [place] [address]  [place] [address] [address]  Tax ID: [id number]  NPI: [id number] ATTENDING PROVIDER:  Attending Name and NPI#: Imani Caro Md [[id number]]  Address: [facility]  Elia [place] [address] [place], [place] [address]daniela Branch  Phone: [phone]   ADMISSION INFORMATION:  Place of Service: [place] [address]  Place of Service Code: 21  Inpatient Admission Date/Time: 12/27/22  6:55 PM  Discharge Date/Time: No discharge date for patient encounter  Admitting Diagnosis Code/Description:  Rhabdomyolysis [M62 82]  Pain [R52]  SIRS (systemic inflammatory response syndrome) (HCC) [R65 10]  Atrial fibrillation with RVR (HCC) [I48 91]  Calculus of gallbladder without cholecystitis without obstruction [K80 20]  Leukocytosis, unspecified type [D72 829]     UTILIZATION REVIEW CONTACT:  Lew Fleming Utilization   Network Utilization Review Department  Phone: [phone]  Fax [phone]  Email: [email or web address]  Contact for approvals/pending authorizations, clinical reviews, and discharge  PHYSICIAN ADVISORY SERVICES:  Medical Necessity Denial & [employer] Review  Phone: [phone]  Fax: [phone]  Email: [email or web address]

## 2022-12-28 NOTE — PLAN OF CARE
Problem: PHYSICAL THERAPY ADULT  Goal: Performs mobility at highest level of function for planned discharge setting  See evaluation for individualized goals  Description: Treatment/Interventions: ADL retraining, Functional transfer training, LE strengthening/ROM, Elevations, Therapeutic exercise, Endurance training, Patient/family training, Equipment eval/education, Bed mobility, Gait training, Compensatory technique education, Spoke to nursing, Spoke to case management, OT  Equipment Recommended: Jazzmine Harden       See flowsheet documentation for full assessment, interventions and recommendations  Note: Prognosis: Good  Problem List: Decreased strength, Decreased endurance, Impaired balance, Decreased mobility, Decreased safety awareness, Obesity, Pain  Assessment: Pt is a [de-identified] y o  female seen for PT evaluation s/p admission to [address] [place] [address] on 12/27/2022 with Fall at home, initial encounter  Order placed for PT services  Upon evaluation: Pt is presenting with impaired functional mobility due to pain, decreased strength, decreased endurance, impaired balance, gait deviations, decreased safety awareness, fall risk, and LE edema requiring  supervision assistance for bed mobility, stand by to steadying assistance for transfers, and steadying to stand by assistance for ambulation with spc    Pt's clinical presentation is currently unpredictable given the functional mobility deficits above, especially weakness, edema of extremities, decreased endurance, gait deviations, pain, decreased activity tolerance, decreased functional mobility tolerance, decreased safety awareness, and SOB upon exertion, coupled with fall risks as indicated by AM-PAC 6-Clicks: 90/53 as well as hx of falls, impaired balance, polypharmacy, and decreased safety awareness and combined with medical complications of tachycardia, pain impacting overall mobility status, abnormal WBCs, need for input for mobility technique/safety and abnormal lactic acid, abnormal CK and CKMB with non-traumatic rhabdomyolysis, Afib with RVR  Pt's PMHx and comorbidities that may affect physical performance and progress include: A fib, obesity and breast CA  Personal factors affecting pt at time of IE include: step(s) to enter environment, multi-level environment, inability to perform IADLs, inability to perform ADLs, inability to navigate level surfaces without external assistance, inability to ambulate household distances and recent fall(s)/fall history  Pt will benefit from continued skilled PT services to address deficits as defined above and to maximize level of functional mobility to facilitate return toward PLOF and improved QOL  From PT/mobility standpoint, recommendation at time of d/c would be Home PT, home with family support and with walker pending progress in order to reduce fall risk and maximize pt's functional independence and consistency with mobility in order to facilitate return to PLOF  Recommend trial with walker next 1-2 sessions, trial with cane next 1-2 sessions and ther ex next 1-2 sessions  Barriers to Discharge: Decreased caregiver support  Barriers to Discharge Comments: lives alone  PT Discharge Recommendation: Home with home health rehabilitation    See flowsheet documentation for full assessment

## 2022-12-28 NOTE — PLAN OF CARE
Problem: Potential for Falls  Goal: Patient will remain free of falls  Description: INTERVENTIONS:  - Educate patient/family on patient safety including physical limitations  - Instruct patient to call for assistance with activity   - Consult OT/PT to assist with strengthening/mobility   - Keep Call bell within reach  - Keep bed low and locked with side rails adjusted as appropriate  - Keep care items and personal belongings within reach  - Initiate and maintain comfort rounds  - Make Fall Risk Sign visible to staff    - Apply yellow socks and bracelet for high fall risk patients  - Consider moving patient to room near nurses station  Outcome: Progressing     Problem: MOBILITY - ADULT  Goal: Maintain or return to baseline ADL function  Description: INTERVENTIONS:  -  Assess patient's ability to carry out ADLs; assess patient's baseline for ADL function and identify physical deficits which impact ability to perform ADLs (bathing, care of mouth/teeth, toileting, grooming, dressing, etc )  - Assess/evaluate cause of self-care deficits   - Assess range of motion  - Assess patient's mobility; develop plan if impaired  - Assess patient's need for assistive devices and provide as appropriate  - Encourage maximum independence but intervene and supervise when necessary  - Involve family in performance of ADLs  - Assess for home care needs following discharge   - Consider OT consult to assist with ADL evaluation and planning for discharge  - Provide patient education as appropriate  Outcome: Progressing  Goal: Maintains/Returns to pre admission functional level  Description: INTERVENTIONS:  - Perform BMAT or MOVE assessment daily    - Set and communicate daily mobility goal to care team and patient/family/caregiver     - Collaborate with rehabilitation services on mobility goals if consulted    - Out of bed for toileting  - Record patient progress and toleration of activity level   Outcome: Progressing     Problem: PAIN - ADULT  Goal: Verbalizes/displays adequate comfort level or baseline comfort level  Description: Interventions:  - Encourage patient to monitor pain and request assistance  - Assess pain using appropriate pain scale  - Administer analgesics based on type and severity of pain and evaluate response  - Implement non-pharmacological measures as appropriate and evaluate response  - Consider cultural and social influences on pain and pain management  - Notify physician/advanced practitioner if interventions unsuccessful or patient reports new pain  Outcome: Progressing     Problem: INFECTION - ADULT  Goal: Absence or prevention of progression during hospitalization  Description: INTERVENTIONS:  - Assess and monitor for signs and symptoms of infection  - Monitor lab/diagnostic results  - Monitor all insertion sites, i e  indwelling lines, tubes, and drains  - Monitor endotracheal if appropriate and nasal secretions for changes in amount and color  - Axtell appropriate cooling/warming therapies per order  - Administer medications as ordered  - Instruct and encourage patient and family to use good hand hygiene technique  - Identify and instruct in appropriate isolation precautions for identified infection/condition  Outcome: Progressing     Problem: SAFETY ADULT  Goal: Patient will remain free of falls  Description: INTERVENTIONS:  - Educate patient/family on patient safety including physical limitations  - Instruct patient to call for assistance with activity   - Consult OT/PT to assist with strengthening/mobility   - Keep Call bell within reach  - Keep bed low and locked with side rails adjusted as appropriate  - Keep care items and personal belongings within reach  - Initiate and maintain comfort rounds  - Make Fall Risk Sign visible to staff    - Apply yellow socks and bracelet for high fall risk patients  - Consider moving patient to room near nurses station  Outcome: Progressing  Goal: Maintain or return to baseline ADL function  Description: INTERVENTIONS:  -  Assess patient's ability to carry out ADLs; assess patient's baseline for ADL function and identify physical deficits which impact ability to perform ADLs (bathing, care of mouth/teeth, toileting, grooming, dressing, etc )  - Assess/evaluate cause of self-care deficits   - Assess range of motion  - Assess patient's mobility; develop plan if impaired  - Assess patient's need for assistive devices and provide as appropriate  - Encourage maximum independence but intervene and supervise when necessary  - Involve family in performance of ADLs  - Assess for home care needs following discharge   - Consider OT consult to assist with ADL evaluation and planning for discharge  - Provide patient education as appropriate  Outcome: Progressing  Goal: Maintains/Returns to pre admission functional level  Description: INTERVENTIONS:  - Perform BMAT or MOVE assessment daily    - Set and communicate daily mobility goal to care team and patient/family/caregiver     - Collaborate with rehabilitation services on mobility goals if consulted    - Out of bed for toileting  - Record patient progress and toleration of activity level   Outcome: Progressing     Problem: DISCHARGE PLANNING  Goal: Discharge to home or other facility with appropriate resources  Description: INTERVENTIONS:  - Identify barriers to discharge w/patient and caregiver  - Arrange for needed discharge resources and transportation as appropriate  - Identify discharge learning needs (meds, wound care, etc )  - Arrange for interpretive services to assist at discharge as needed  - Refer to Case Management Department for coordinating discharge planning if the patient needs post-hospital services based on physician/advanced practitioner order or complex needs related to functional status, cognitive ability, or social support system  Outcome: Progressing     Problem: Knowledge Deficit  Goal: Patient/family/caregiver demonstrates understanding of disease process, treatment plan, medications, and discharge instructions  Description: Complete learning assessment and assess knowledge base    Interventions:  - Provide teaching at level of understanding  - Provide teaching via preferred learning methods  Outcome: Progressing

## 2022-12-28 NOTE — PLAN OF CARE
Problem: PHYSICAL THERAPY ADULT  Goal: Performs mobility at highest level of function for planned discharge setting  See evaluation for individualized goals  Description: Treatment/Interventions: ADL retraining, Functional transfer training, LE strengthening/ROM, Elevations, Therapeutic exercise, Endurance training, Patient/family training, Equipment eval/education, Bed mobility, Gait training, Compensatory technique education, Spoke to nursing, Spoke to case management, OT  Equipment Recommended: Kirstin Winslow       See flowsheet documentation for full assessment, interventions and recommendations  [id number] by Devang Silvestre, PT  Note: Prognosis: Good  Problem List: Decreased strength, Decreased endurance, Impaired balance, Decreased mobility, Decreased safety awareness, Obesity, Pain  Pt tolerated session fairly well  She was able to ambulate with decreased assistance and complete transfers wiht decreased assistance with use of RW compared to spc  She reports fatigue after associated with elevated HR  She is limited by decreased strength, balance, endurance  She will continue to benefit from PT services to maximize LOF  Barriers to Discharge: Decreased caregiver support  Barriers to Discharge Comments: lives alone  PT Discharge Recommendation: Home with home health rehabilitation    See flowsheet documentation for full assessment

## 2022-12-28 NOTE — ASSESSMENT & PLAN NOTE
She had a mechanical fall at home where she tripped over a rug and was unable to stand up afterwards for almost 30 hours  He did not take any of her medications or eat or drink for that time  Finally was able to call 911 and her family    Will hydrate the patient and get PT OT eval   Fortunately trauma series is negative for any fractures  Bruising noted secondary to being on the floor on her left breast and her legs which do not appear to be too concerning however we will hold Eliquis tonight and restart from tomorrow

## 2022-12-28 NOTE — PLAN OF CARE
Problem: PHYSICAL THERAPY ADULT  Goal: Performs mobility at highest level of function for planned discharge setting  See evaluation for individualized goals  Description: Treatment/Interventions: ADL retraining, Functional transfer training, LE strengthening/ROM, Elevations, Therapeutic exercise, Endurance training, Patient/family training, Equipment eval/education, Bed mobility, Gait training, Compensatory technique education, Spoke to nursing, Spoke to case management, OT  Equipment Recommended: Jaquita Riedel       See flowsheet documentation for full assessment, interventions and recommendations  [id number] by Tamika Estevez PT  Note: Prognosis: Good  Problem List: Decreased strength, Decreased endurance, Impaired balance, Decreased mobility, Decreased safety awareness, Obesity, Pain  Pt tolerated session fairly well  She was able to ambulate with decreased assistance and complete transfers wiht decreased assistance with use of RW compared to spc  She reports fatigue after associated with elevated HR  She is limited by decreased strength, balance, endurance  She will continue to benefit from PT services to maximize LOF  Barriers to Discharge: Decreased caregiver support  Barriers to Discharge Comments: lives alone  PT Discharge Recommendation: Home with home health rehabilitation    See flowsheet documentation for full assessment

## 2022-12-28 NOTE — ASSESSMENT & PLAN NOTE
Presented with A  fib with RVR with heart rates in the 170s secondary to not taking her metoprolol for 30 hours  Start home dose of metoprolol 50 mg bid and placed on as needed IV Lopressor in case heart rate more than 120  12/28: Rates are still elevated in the 120s to 140s did require IV Lopressor and IV Cardizem boluses overnight  And IV Lopressor this morning as well  We will increase metoprolol to 75 mg twice daily 1 additional dose of IV Cardizem 15 mg ordered now  12/29: Still noted to have elevated heart rates this morning  Increased to Toprol- mg twice daily and added Cardizem 30 mg every 6  If heart rates are still uncontrolled will place on digoxin    Cardiology for consultation  Continue Eliquis for anticoagulation

## 2022-12-28 NOTE — ASSESSMENT & PLAN NOTE
fell at home and was on the ground for almost 30 hours    Noted to have CK total of 1293   placed on IV fluid hydration now resolved and off IV fluid hydration  renal function is normal so far  Seen by PT OT and recommend home with home health once medically cleared

## 2022-12-28 NOTE — ASSESSMENT & PLAN NOTE
Presented with A  fib with RVR with heart rates in the 170s secondary to not taking her metoprolol for 30 hours  Start home dose of metoprolol 50 mg bid and placed on as needed IV Lopressor in case heart rate more than 120  12/28: Rates are still elevated in the 120s to 140s did require IV Lopressor and IV Cardizem boluses overnight  And IV Lopressor this morning as well  We will increase metoprolol to 75 mg twice daily 1 additional dose of IV Cardizem 15 mg ordered now  If patient continues to have elevated heart rates might require Cardizem drip    Cardiology for consultation  Continue Eliquis for anticoagulation

## 2022-12-28 NOTE — PLAN OF CARE
Problem: OCCUPATIONAL THERAPY ADULT  Goal: Performs self-care activities at highest level of function for planned discharge setting  See evaluation for individualized goals  Description: Treatment Interventions: ADL retraining, Functional transfer training, UE strengthening/ROM, Endurance training, Equipment evaluation/education, Compensatory technique education, Continued evaluation, Energy conservation, Activityengagement          See flowsheet documentation for full assessment, interventions and recommendations  Note: Limitation: Decreased ADL status, Decreased UE strength, Decreased endurance, Decreased self-care trans, Decreased high-level ADLs  Prognosis: Good  Assessment: Pt is a [de-identified] y o  female, admitted to [facility] 12/27/2022 d/t experiencing a fall at home and remaining on floor for +26hours  Dx: fall  Pt with PMHx impacting their performance during ADL tasks, including: a-fib, breast cancer  Prior to admission to the hospital Pt was performing ADLs without physical assistance  IADLs without physical assistance  Functional transfers/ambulation without physical assistance  Cognitive status was PTA was intact  OT order placed to assess Pt's ADLs, cognitive status, and performance during functional tasks in order to maximize safety and independence while making most appropriate d/c recommendations   Pt's clinical presentation is currently unstable/unpredictable given new onset deficits that effect Pt's occupational performance and ability to safely return to PLOF including decrease activity tolerance, decrease standing balance, decrease performance during ADL tasks, decrease safety awareness , decrease UB MS, decrease generalized strength, decrease activity engagement, decrease performance during functional transfers and high fall risk combined with medical complications of hypertension , tachycardia, pain impacting overall mobility status, A-fib, abnormal CBC, increased RR, decreased skin integrity and need for input for mobility technique/safety  Pt with increased HR during activity ranging from 125-155bpm  RN Xiao aware  Personal factors affecting Pt at time of initial evaluation include: step(s) to enter environment, multi-level environment, availability as recommended, limited home support, inability to perform current job functions, inability to perform IADLs, new need for AD, inability to navigate community distances, decreased initiation and engagement and recent fall(s)/fall history  Pt will benefit from continued skilled OT services to address deficits as defined above and to maximize level independence/participation during ADLs and functional tasks to facilitate return toward PLOF and improved quality of life  From an occupational therapy standpoint, recommendation at time of d/c would be HHOT       OT Discharge Recommendation: Home with home health rehabilitation

## 2022-12-28 NOTE — PROGRESS NOTES
[address]  Progress Note Shital Running 1940, [de-identified] y o  female MRN: [id number]  Unit/Bed#: -02 Encounter: [id number]  Primary Care Provider: David Bryant DO   Date and time admitted to hospital: 12/27/2022  5:30 PM    * Fall at home, initial encounter  [address] [facility]  She had a mechanical fall at home where she tripped over a rug and was unable to stand up afterwards for almost 30 hours  she did not take any of her medications or eat or drink for that time  Finally was able to call 911 and her family  Will hydrate the patient and get PT OT eval   Fortunately trauma series is negative for any fractures  Bruising noted secondary to being on the floor on her left breast and her legs which do not appear to be too concerning however we resume Eliquis     Atrial fibrillation with RVR ([facility] [address] )  Assessment & Plan  Presented with A  fib with RVR with heart rates in the 170s secondary to not taking her metoprolol for 30 hours  Start home dose of metoprolol 50 mg bid and placed on as needed IV Lopressor in case heart rate more than 120  12/28: Rates are still elevated in the 120s to 140s did require IV Lopressor and IV Cardizem boluses overnight  And IV Lopressor this morning as well  We will increase metoprolol to 75 mg twice daily 1 additional dose of IV Cardizem 15 mg ordered now  If patient continues to have elevated heart rates might require Cardizem drip  Cardiology for consultation  Continue Eliquis for anticoagulation    Traumatic rhabdomyolysis [facility])  Assessment & Plan  fell at home and was on the ground for almost 30 hours  Noted to have CK total of 1293   placed on IV fluid hydration and recheck CK daily  renall function is normal so far    Morbid obesity ([facility] [address] )  Assessment & Plan  BMI is 41 2  Will  on diet exercise and lifestyle modification    Lactic acidosis  Assessment & Plan  Lactic acidosis of 3 5 noted secondary to dehydration and rhabdomyolysis  placed on IV fluids  Also noted to have leukocytosis secondary to dehydration but no evidence of infection at this time  VTE Pharmacologic Prophylaxis:   Pharmacologic: Apixaban (Eliquis)  Mechanical VTE Prophylaxis in Place: Yes    Patient Centered Rounds: I have performed bedside rounds with nursing staff today  Discussions with Specialists or Other Care Team Provider: We will discuss with cardiology    Education and Discussions with Family / Patient: Discussed with patient at bedside and will update family later today    Time Spent for Care: 45 minutes  More than 50% of total time spent on counseling and coordination of care as described above  Current Length of Stay: 1 day(s)    Current Patient Status: Inpatient   Certification Statement: The patient will continue to require additional inpatient hospital stay due to Fall initial encounter and A  fib with RVR    Discharge Plan: Probably require subacute rehab pending PT eval    Code Status: Level 1 - Full Code      Subjective:   Patient states that she feels sore today and feels like it really hit her today  Still having some palpitations and feels generalized weakness    Objective:     Vitals:   Temp (24hrs), Av 4 °F (36 9 °C), Min:98 1 °F (36 7 °C), Max:98 8 °F (37 1 °C)    Temp:  [98 1 °F (36 7 °C)-98 8 °F (37 1 °C)] 98 2 °F (36 8 °C)  HR:  [] 136  Resp:  [14-27] 18  BP: (115-206)/() 118/79  SpO2:  [93 %-98 %] 93 %  Body mass index is 43 06 kg/m²  Input and Output Summary (last 24 hours): Intake/Output Summary (Last 24 hours) at 2022 1439  Last data filed at 2022 1252  Gross per 24 hour   Intake 480 ml   Output --   Net 480 ml       Physical Exam:     Physical Exam  Vitals and nursing note reviewed  Constitutional:       Appearance: She is obese  She is ill-appearing  HENT:      Head: Normocephalic and atraumatic        Right Ear: External ear normal       Left Ear: External ear normal       Nose: Nose normal       Mouth/Throat:      Pharynx: Oropharynx is clear  Cardiovascular:      Rate and Rhythm: Tachycardia present  Rhythm irregular  Heart sounds: Normal heart sounds  Pulmonary:      Effort: Pulmonary effort is normal       Breath sounds: Normal breath sounds  Abdominal:      General: Bowel sounds are normal       Palpations: Abdomen is soft  Tenderness: There is no abdominal tenderness  Musculoskeletal:      Cervical back: Normal range of motion and neck supple  Right lower leg: Edema present  Left lower leg: Edema present  Comments: Bruising noted on the left breast   Skin:     General: Skin is warm and dry  Capillary Refill: Capillary refill takes less than 2 seconds  Neurological:      General: No focal deficit present  Mental Status: She is alert and oriented to person, place, and time  Psychiatric:         Mood and Affect: Mood normal            Additional Data:     Labs:    Results from last 7 days   Lab Units 12/28/22  0514 12/27/22  1743   WBC Thousand/uL 22 13* 24 08*   HEMOGLOBIN g/dL 13 7 16 1*   HEMATOCRIT % 42 8 49 6*   PLATELETS Thousands/uL 195 243   NEUTROS PCT %  --  84*   LYMPHS PCT %  --  5*   MONOS PCT %  --  8   EOS PCT %  --  1     Results from last 7 days   Lab Units 12/28/22  0514   SODIUM mmol/L 143   POTASSIUM mmol/L 3 9   CHLORIDE mmol/L 107   CO2 mmol/L 22   BUN mg/dL 16   CREATININE mg/dL 0 77   ANION GAP mmol/L 14*   CALCIUM mg/dL 8 1*   ALBUMIN g/dL 2 8*   TOTAL BILIRUBIN mg/dL 3 31*   ALK PHOS U/L 39*   ALT U/L 23   AST U/L 35   GLUCOSE RANDOM mg/dL 102     Results from last 7 days   Lab Units 12/27/22  1743   INR  1 21*             Results from last 7 days   Lab Units 12/27/22  1949 12/27/22  1752   LACTIC ACID mmol/L 1 8 3 5*           * I Have Reviewed All Lab Data Listed Above  * Additional Pertinent Lab Tests Reviewed:  All Labs For Current Hospital Admission Reviewed    Imaging:    Imaging Reports Reviewed Today Include: Reviewed trauma series  Imaging Personally Reviewed by Myself Includes: Reviewed trauma series    Recent Cultures (last 7 days):     Results from last 7 days   Lab Units 12/27/22  1816 12/27/22  1750   BLOOD CULTURE  Received in Microbiology Lab  Culture in Progress  Received in Microbiology Lab  Culture in Progress  Last 24 Hours Medication List:   Current Facility-Administered Medications   Medication Dose Route Frequency Provider Last Rate   • acetaminophen  650 mg Oral Q6H PRN Imani Caro MD     • apixaban  5 mg Oral BID Imani Caro MD     • docusate sodium  100 mg Oral BID Imani Caro MD     • metoprolol  5 mg Intravenous Q6H PRN Imani Caro MD     • metoprolol tartrate  75 mg Oral BID Imani Caro MD     • ondansetron  4 mg Intravenous Q6H PRN Imani Caro MD          Today, Patient Was Seen By: Imani Caro MD    ** Please Note: Dictation voice to text software may have been used in the creation of this document   **

## 2022-12-28 NOTE — PROGRESS NOTES
-- Patient:  -- MRN: [id number]  -- Aidin Request ID: [id number]  -- Level of care reserved: [address] [place] [address]  -- [facility] Malikgosia Ramirezne, [address] [facility] [phone]  -- Clinical needs requested:  -- Geography searched: [place]gins  -- Start of Service:  -- Request sent: 12:11pm EST on 12/28/2022 by Lucas Gallegos  -- Partner reserved: 2:06pm EST on 12/28/2022 by Lucas Gallegos  -- Choice list shared: 2:05pm EST on 12/28/2022 by Lucas Gallegos

## 2022-12-28 NOTE — ASSESSMENT & PLAN NOTE
She had a mechanical fall at home where she tripped over a rug and was unable to stand up afterwards for almost 30 hours  she did not take any of her medications or eat or drink for that time  Finally was able to call 911 and her family    Will hydrate the patient and get PT OT eval   Fortunately trauma series is negative for any fractures  Bruising noted secondary to being on the floor on her left breast and her legs which do not appear to be too concerning however cont Eliquis

## 2022-12-29 PROBLEM — R79.89 ELEVATED LACTIC ACID LEVEL: Status: ACTIVE | Noted: 2022-12-27

## 2022-12-29 LAB
ALBUMIN SERPL BCP-MCNC: 2.8 G/DL (ref 3.5–5)
ALP SERPL-CCNC: 37 U/L (ref 46–116)
ALT SERPL W P-5'-P-CCNC: 29 U/L (ref 12–78)
ANION GAP SERPL CALCULATED.3IONS-SCNC: 10 MMOL/L (ref 4–13)
AST SERPL W P-5'-P-CCNC: 33 U/L (ref 5–45)
BILIRUB SERPL-MCNC: 1.61 MG/DL (ref 0.2–1)
BUN SERPL-MCNC: 17 MG/DL (ref 5–25)
CALCIUM ALBUM COR SERPL-MCNC: 9.4 MG/DL (ref 8.3–10.1)
CALCIUM SERPL-MCNC: 8.4 MG/DL (ref 8.3–10.1)
CHLORIDE SERPL-SCNC: 107 MMOL/L (ref 96–108)
CO2 SERPL-SCNC: 26 MMOL/L (ref 21–32)
CREAT SERPL-MCNC: 0.7 MG/DL (ref 0.6–1.3)
DME PARACHUTE DELIVERY DATE REQUESTED: NORMAL
DME PARACHUTE ITEM DESCRIPTION: NORMAL
DME PARACHUTE ORDER STATUS: NORMAL
DME PARACHUTE SUPPLIER NAME: NORMAL
DME PARACHUTE SUPPLIER PHONE: NORMAL
GFR SERPL CREATININE-BSD FRML MDRD: 82 ML/MIN/1.73SQ M
GLUCOSE SERPL-MCNC: 111 MG/DL (ref 65–140)
POTASSIUM SERPL-SCNC: 3.7 MMOL/L (ref 3.5–5.3)
PROT SERPL-MCNC: 6.1 G/DL (ref 6.4–8.4)
SODIUM SERPL-SCNC: 143 MMOL/L (ref 135–147)

## 2022-12-29 RX ORDER — METOPROLOL SUCCINATE 100 MG/1
100 TABLET, EXTENDED RELEASE ORAL 2 TIMES DAILY
Status: DISCONTINUED | OUTPATIENT
Start: 2022-12-29 | End: 2022-12-30 | Stop reason: HOSPADM

## 2022-12-29 RX ADMIN — DILTIAZEM HYDROCHLORIDE 30 MG: 30 TABLET, FILM COATED ORAL at 12:06

## 2022-12-29 RX ADMIN — APIXABAN 5 MG: 5 TABLET, FILM COATED ORAL at 08:14

## 2022-12-29 RX ADMIN — DILTIAZEM HYDROCHLORIDE 30 MG: 30 TABLET, FILM COATED ORAL at 23:53

## 2022-12-29 RX ADMIN — APIXABAN 5 MG: 5 TABLET, FILM COATED ORAL at 17:36

## 2022-12-29 RX ADMIN — METOPROLOL TARTRATE 75 MG: 50 TABLET, FILM COATED ORAL at 08:14

## 2022-12-29 RX ADMIN — DOCUSATE SODIUM 100 MG: 100 CAPSULE ORAL at 08:15

## 2022-12-29 RX ADMIN — METOROPROLOL TARTRATE 5 MG: 5 INJECTION, SOLUTION INTRAVENOUS at 04:31

## 2022-12-29 RX ADMIN — DILTIAZEM HYDROCHLORIDE 30 MG: 30 TABLET, FILM COATED ORAL at 17:36

## 2022-12-29 RX ADMIN — DILTIAZEM HYDROCHLORIDE 30 MG: 30 TABLET, FILM COATED ORAL at 08:31

## 2022-12-29 RX ADMIN — METOPROLOL SUCCINATE 100 MG: 100 TABLET, EXTENDED RELEASE ORAL at 16:18

## 2022-12-29 NOTE — PLAN OF CARE
Problem: Potential for Falls  Goal: Patient will remain free of falls  Description: INTERVENTIONS:  - Educate patient/family on patient safety including physical limitations  - Instruct patient to call for assistance with activity   - Consult OT/PT to assist with strengthening/mobility   - Keep Call bell within reach  - Keep bed low and locked with side rails adjusted as appropriate  - Keep care items and personal belongings within reach  - Initiate and maintain comfort rounds  - Make Fall Risk Sign visible to staff  - Apply yellow socks and bracelet for high fall risk patients  - Consider moving patient to room near nurses station  Outcome: Progressing     Problem: MOBILITY - ADULT  Goal: Maintain or return to baseline ADL function  Description: INTERVENTIONS:  -  Assess patient's ability to carry out ADLs; assess patient's baseline for ADL function and identify physical deficits which impact ability to perform ADLs (bathing, care of mouth/teeth, toileting, grooming, dressing, etc )  - Assess/evaluate cause of self-care deficits   - Assess range of motion  - Assess patient's mobility; develop plan if impaired  - Assess patient's need for assistive devices and provide as appropriate  - Encourage maximum independence but intervene and supervise when necessary  - Involve family in performance of ADLs  - Assess for home care needs following discharge   - Consider OT consult to assist with ADL evaluation and planning for discharge  - Provide patient education as appropriate  Outcome: Progressing  Goal: Maintains/Returns to pre admission functional level  Description: INTERVENTIONS:  - Perform BMAT or MOVE assessment daily    - Set and communicate daily mobility goal to care team and patient/family/caregiver  - Collaborate with rehabilitation services on mobility goals if consulted  - Perform Range of Motion 3 times a day  - Reposition patient every 3 hours    - Dangle patient 3 times a day  - Stand patient 3 times a day  - Ambulate patient 3 times a day  - Out of bed to chair 3 times a day   - Out of bed for meals 3 times a day  - Out of bed for toileting  - Record patient progress and toleration of activity level   Outcome: Progressing     Problem: PAIN - ADULT  Goal: Verbalizes/displays adequate comfort level or baseline comfort level  Description: Interventions:  - Encourage patient to monitor pain and request assistance  - Assess pain using appropriate pain scale  - Administer analgesics based on type and severity of pain and evaluate response  - Implement non-pharmacological measures as appropriate and evaluate response  - Consider cultural and social influences on pain and pain management  - Notify physician/advanced practitioner if interventions unsuccessful or patient reports new pain  Outcome: Progressing     Problem: INFECTION - ADULT  Goal: Absence or prevention of progression during hospitalization  Description: INTERVENTIONS:  - Assess and monitor for signs and symptoms of infection  - Monitor lab/diagnostic results  - Monitor all insertion sites, i e  indwelling lines, tubes, and drains  - Monitor endotracheal if appropriate and nasal secretions for changes in amount and color  - [place] appropriate cooling/warming therapies per order  - Administer medications as ordered  - Instruct and encourage patient and family to use good hand hygiene technique  - Identify and instruct in appropriate isolation precautions for identified infection/condition  Outcome: Progressing     Problem: SAFETY ADULT  Goal: Patient will remain free of falls  Description: INTERVENTIONS:  - Educate patient/family on patient safety including physical limitations  - Instruct patient to call for assistance with activity   - Consult OT/PT to assist with strengthening/mobility   - Keep Call bell within reach  - Keep bed low and locked with side rails adjusted as appropriate  - Keep care items and personal belongings within reach  - Initiate and maintain comfort rounds  - Make Fall Risk Sign visible to staff  - Apply yellow socks and bracelet for high fall risk patients  - Consider moving patient to room near nurses station  Outcome: Progressing  Goal: Maintain or return to baseline ADL function  Description: INTERVENTIONS:  -  Assess patient's ability to carry out ADLs; assess patient's baseline for ADL function and identify physical deficits which impact ability to perform ADLs (bathing, care of mouth/teeth, toileting, grooming, dressing, etc )  - Assess/evaluate cause of self-care deficits   - Assess range of motion  - Assess patient's mobility; develop plan if impaired  - Assess patient's need for assistive devices and provide as appropriate  - Encourage maximum independence but intervene and supervise when necessary  - Involve family in performance of ADLs  - Assess for home care needs following discharge   - Consider OT consult to assist with ADL evaluation and planning for discharge  - Provide patient education as appropriate  Outcome: Progressing  Goal: Maintains/Returns to pre admission functional level  Description: INTERVENTIONS:  - Perform BMAT or MOVE assessment daily    - Set and communicate daily mobility goal to care team and patient/family/caregiver  - Collaborate with rehabilitation services on mobility goals if consulted  - Perform Range of Motion 3 times a day  - Reposition patient every 3 hours    - Dangle patient 3 times a day  - Stand patient 3 times a day  - Ambulate patient 3 times a day  - Out of bed to chair 3 times a day   - Out of bed for meals 3 times a day  - Out of bed for toileting  - Record patient progress and toleration of activity level   Outcome: Progressing     Problem: DISCHARGE PLANNING  Goal: Discharge to home or other facility with appropriate resources  Description: INTERVENTIONS:  - Identify barriers to discharge w/patient and caregiver  - Arrange for needed discharge resources and transportation as appropriate  - Identify discharge learning needs (meds, wound care, etc )  - Arrange for interpretive services to assist at discharge as needed  - Refer to Case Management Department for coordinating discharge planning if the patient needs post-hospital services based on physician/advanced practitioner order or complex needs related to functional status, cognitive ability, or social support system  Outcome: Progressing     Problem: Knowledge Deficit  Goal: Patient/family/caregiver demonstrates understanding of disease process, treatment plan, medications, and discharge instructions  Description: Complete learning assessment and assess knowledge base    Interventions:  - Provide teaching at level of understanding  - Provide teaching via preferred learning methods  Outcome: Progressing

## 2022-12-29 NOTE — CASE MANAGEMENT
Case Management Discharge Planning Note    Patient name Vivian Gallego  Location [address] 340/-[id number] MRN [id number]  : 1940 Date 2022       Current Admission Date: 2022  Current Admission Diagnosis:Fall at home, initial encounter   Patient Active Problem List    Diagnosis Date Noted   • Traumatic rhabdomyolysis ([facility] ) 2022   • Fall at home, initial encounter 2022   • Atrial fibrillation with RVR ([facility] ) 2022   • Lactic acidosis 2022   • Morbid obesity ([facility] ) 2022      LOS (days): 2  Geometric Mean LOS (GMLOS) (days): 3 30  Days to GMLOS:1 6     OBJECTIVE:  Risk of Unplanned Readmission Score: 8 58         Current admission status: Inpatient   Preferred Pharmacy:   [address] [place] [address]  Phone: [phone] Fax: [phone]    Primary Care Provider: Chun Olivas DO    Primary Insurance: [facility]  Secondary Insurance:     DISCHARGE DETAILS:           CM met with patient and provided [place] RW  Patient thankful indicating its a perfect size for use around the home

## 2022-12-29 NOTE — PROGRESS NOTES
Progress Note - Cardiology   Lindsey Gutierres [de-identified] y o  female MRN: [id number]  Unit/Bed#: -02 Encounter: [id number]    Assessment:  Mechanical fall at home with inability to ambulate for 30 hours              - CK elevation and evidence of rhabdo              - renal function normal              - currently receiving fluids  Afib with rvr- patient with known chronic afib on Eliquis, did not get BB for 30 hours and likely rates elevated to some degree from dehydration and rhabdo  Obesity  Elevated lactate level- normalized  Normal LV function     Plan:  1  Recommend oral hydration  2  Agree with addition of Cardizem  Switch from Lopressor to Toprol xl 100 mg PO BID   3  Monitor rates throughout the day  If Cardizem is not helping can consider dig loading  4  Patient has had afib for over a year  Chances of restoring normal sinus are low  LA is moderately dilated  5  Continue anticoagulation  Subjective/Objective     Subjective: patient reports no chest pain overnight  Reports a lot of pain in her joints from her fall  She reports this pain is making her HR go up  She has no real palpitations  Objective: HR elevated overnight   Started on oral cardizem this AM     Vitals: BP (!) 163/108 (BP Location: Right arm)   Pulse (!) 120   Temp 98 5 °F (36 9 °C) (Oral)   Resp 18   Ht 5' (1 524 m)   Wt 101 kg (222 lb 7 1 oz)   SpO2 98%   BMI 43 44 kg/m²   Vitals:    12/28/22 0552 12/29/22 0600   Weight: 100 kg (220 lb 7 4 oz) 101 kg (222 lb 7 1 oz)     Orthostatic Blood Pressures    Flowsheet Row Most Recent Value   Blood Pressure 163/108 filed at 12/29/2022 0700   Patient Position - Orthostatic VS Lying filed at 12/29/2022 0700            Intake/Output Summary (Last 24 hours) at 12/29/2022 1014  Last data filed at 12/29/2022 0658  Gross per 24 hour   Intake 240 ml   Output 250 ml   Net -10 ml       Invasive Devices     Peripheral Intravenous Line  Duration           Peripheral IV 12/27/22 Left Antecubital 1 day                  Physical Exam  Vitals and nursing note reviewed  Constitutional:       Appearance: Normal appearance  HENT:      Head: Normocephalic and atraumatic  Cardiovascular:      Rate and Rhythm: Rhythm irregular  Heart sounds: No murmur heard  Pulmonary:      Effort: Pulmonary effort is normal       Breath sounds: No wheezing  Abdominal:      Palpations: Abdomen is soft  Musculoskeletal:         General: Swelling present  Cervical back: Neck supple  Skin:     Capillary Refill: Capillary refill takes less than 2 seconds  Neurological:      General: No focal deficit present  Mental Status: She is alert and oriented to person, place, and time  Psychiatric:         Mood and Affect: Mood normal          Thought Content: Thought content normal      Lab Results:   I have personally reviewed pertinent lab results      CBC with diff:   Results from last 7 days   Lab Units 12/28/22  0514   WBC Thousand/uL 22 13*   RBC Million/uL 4 47   HEMOGLOBIN g/dL 13 7   HEMATOCRIT % 42 8   MCV fL 96   MCH pg 30 6   MCHC g/dL 32 0   RDW % 13 2   MPV fL 11 2   PLATELETS Thousands/uL 195     CMP:   Results from last 7 days   Lab Units 12/29/22  0541   SODIUM mmol/L 143   CHLORIDE mmol/L 107   CO2 mmol/L 26   BUN mg/dL 17   CREATININE mg/dL 0 70   CALCIUM mg/dL 8 4   AST U/L 33   ALT U/L 29   ALK PHOS U/L 37*   EGFR ml/min/1 73sq m 82     HS Troponin:   0   Lab Value Date/Time    HSTNI0 14 12/27/2022 1823    HSTNI2 14 12/27/2022 2141    HSTNI4 14 12/27/2022 2341     BNP:   Results from last 7 days   Lab Units 12/29/22  0541   POTASSIUM mmol/L 3 7   CHLORIDE mmol/L 107   CO2 mmol/L 26   BUN mg/dL 17   CREATININE mg/dL 0 70   CALCIUM mg/dL 8 4   EGFR ml/min/1 73sq m 82     Coags:   Results from last 7 days   Lab Units 12/27/22  1743   PTT seconds 24   INR  1 21*     TSH:   Results from last 7 days   Lab Units 12/28/22  0514   TSH 3RD GENERATON uIU/mL 1 335     Magnesium:     Lipid Profile: Imaging: I have personally reviewed pertinent reports        Echo 9/10/21:  •  Left Ventricle: There is mild concentric hypertrophy  Systolic function   is normal with an ejection fraction of 55-60%  There is grade I (mild)   diastolic dysfunction  •  Left Atrium: Left atrium cavity is moderately dilated  Left atrium   volume index is moderately increased  •  Aortic Valve: The aortic valve is trileaflet  The leaflets are mildly   calcified  There is mild sclerosis  •  Mitral Valve: There is mild annular calcification   There is mild   regurgitation with a centrally directed jet       EKG: Atrial fibrillation with rapid ventricular response  Rightward axis  ST & T wave abnormality, consider inferior ischemia  Abnormal ECG  No previous ECGs available  Confirmed by Daryn Dan ([id number]) on 12/28/2022 8:59:02 AM

## 2022-12-29 NOTE — PLAN OF CARE
Problem: Potential for Falls  Goal: Patient will remain free of falls  Description: INTERVENTIONS:  - Educate patient/family on patient safety including physical limitations  - Instruct patient to call for assistance with activity   - Consult OT/PT to assist with strengthening/mobility   - Keep Call bell within reach  - Keep bed low and locked with side rails adjusted as appropriate  - Keep care items and personal belongings within reach  - Initiate and maintain comfort rounds  - Make Fall Risk Sign visible to staff  - Offer Toileting every 2 Hours, in advance of need  - Initiate/Maintain bedalarm    - Apply yellow socks and bracelet for high fall risk patients  - Consider moving patient to room near nurses station  Outcome: Progressing     Problem: MOBILITY - ADULT  Goal: Maintain or return to baseline ADL function  Description: INTERVENTIONS:  -  Assess patient's ability to carry out ADLs; assess patient's baseline for ADL function and identify physical deficits which impact ability to perform ADLs (bathing, care of mouth/teeth, toileting, grooming, dressing, etc )  - Assess/evaluate cause of self-care deficits   - Assess range of motion  - Assess patient's mobility; develop plan if impaired  - Assess patient's need for assistive devices and provide as appropriate  - Encourage maximum independence but intervene and supervise when necessary  - Involve family in performance of ADLs  - Assess for home care needs following discharge   - Consider OT consult to assist with ADL evaluation and planning for discharge  - Provide patient education as appropriate  Outcome: Progressing  Goal: Maintains/Returns to pre admission functional level  Description: INTERVENTIONS:  - Perform BMAT or MOVE assessment daily    - Set and communicate daily mobility goal to care team and patient/family/caregiver  - Collaborate with rehabilitation services on mobility goals if consulted    - Reposition patient every 2 hours      - Stand patient 3 times a day  - Ambulate patient 3 times a day  - Out of bed to chair 3 times a day   - Out of bed for meals 3 times a day  - Out of bed for toileting  - Record patient progress and toleration of activity level   Outcome: Progressing     Problem: PAIN - ADULT  Goal: Verbalizes/displays adequate comfort level or baseline comfort level  Description: Interventions:  - Encourage patient to monitor pain and request assistance  - Assess pain using appropriate pain scale  - Administer analgesics based on type and severity of pain and evaluate response  - Implement non-pharmacological measures as appropriate and evaluate response  - Consider cultural and social influences on pain and pain management  - Notify physician/advanced practitioner if interventions unsuccessful or patient reports new pain  Outcome: Progressing     Problem: INFECTION - ADULT  Goal: Absence or prevention of progression during hospitalization  Description: INTERVENTIONS:  - Assess and monitor for signs and symptoms of infection  - Monitor lab/diagnostic results  - Monitor all insertion sites, i e  indwelling lines, tubes, and drains  - Monitor endotracheal if appropriate and nasal secretions for changes in amount and color  - [place] appropriate cooling/warming therapies per order  - Administer medications as ordered  - Instruct and encourage patient and family to use good hand hygiene technique  - Identify and instruct in appropriate isolation precautions for identified infection/condition  Outcome: Progressing     Problem: SAFETY ADULT  Goal: Patient will remain free of falls  Description: INTERVENTIONS:  - Educate patient/family on patient safety including physical limitations  - Instruct patient to call for assistance with activity   - Consult OT/PT to assist with strengthening/mobility   - Keep Call bell within reach  - Keep bed low and locked with side rails adjusted as appropriate  - Keep care items and personal belongings within reach  - Initiate and maintain comfort rounds  - Make Fall Risk Sign visible to staff  - Offer Toileting every 2 Hours, in advance of need  - Initiate/Maintain bedalarm    - Apply yellow socks and bracelet for high fall risk patients  - Consider moving patient to room near nurses station  Outcome: Progressing  Goal: Maintain or return to baseline ADL function  Description: INTERVENTIONS:  -  Assess patient's ability to carry out ADLs; assess patient's baseline for ADL function and identify physical deficits which impact ability to perform ADLs (bathing, care of mouth/teeth, toileting, grooming, dressing, etc )  - Assess/evaluate cause of self-care deficits   - Assess range of motion  - Assess patient's mobility; develop plan if impaired  - Assess patient's need for assistive devices and provide as appropriate  - Encourage maximum independence but intervene and supervise when necessary  - Involve family in performance of ADLs  - Assess for home care needs following discharge   - Consider OT consult to assist with ADL evaluation and planning for discharge  - Provide patient education as appropriate  Outcome: Progressing  Goal: Maintains/Returns to pre admission functional level  Description: INTERVENTIONS:  - Perform BMAT or MOVE assessment daily    - Set and communicate daily mobility goal to care team and patient/family/caregiver     - Collaborate with rehabilitation services on mobility goals if consulted    - Reposition patient every 2 hours  - Stand patient 3 times a day  - Ambulate patient 3 times a day  - Out of bed to chair 3 times a day   - Out of bed for meals 3 times a day  - Out of bed for toileting  - Record patient progress and toleration of activity level   Outcome: Progressing     Problem: DISCHARGE PLANNING  Goal: Discharge to home or other facility with appropriate resources  Description: INTERVENTIONS:  - Identify barriers to discharge w/patient and caregiver  - Arrange for needed discharge resources and transportation as appropriate  - Identify discharge learning needs (meds, wound care, etc )  - Arrange for interpretive services to assist at discharge as needed  - Refer to Case Management Department for coordinating discharge planning if the patient needs post-hospital services based on physician/advanced practitioner order or complex needs related to functional status, cognitive ability, or social support system  Outcome: Progressing     Problem: Knowledge Deficit  Goal: Patient/family/caregiver demonstrates understanding of disease process, treatment plan, medications, and discharge instructions  Description: Complete learning assessment and assess knowledge base    Interventions:  - Provide teaching at level of understanding  - Provide teaching via preferred learning methods  Outcome: Progressing     Problem: Prexisting or High Potential for Compromised Skin Integrity  Goal: Skin integrity is maintained or improved  Description: INTERVENTIONS:  - Identify patients at risk for skin breakdown  - Assess and monitor skin integrity  - Assess and monitor nutrition and hydration status  - Monitor labs   - Assess for incontinence   - Turn and reposition patient  - Assist with mobility/ambulation  - Relieve pressure over bony prominences  - Avoid friction and shearing  - Provide appropriate hygiene as needed including keeping skin clean and dry  - Evaluate need for skin moisturizer/barrier cream  - Collaborate with interdisciplinary team   - Patient/family teaching  - Consider wound care consult   Outcome: Progressing

## 2022-12-29 NOTE — CASE MANAGEMENT
Case Management Discharge Planning Note    Patient name Wesley Has  [address] 340/-94 MRN [id number]  : 1940 Date 2022       Current Admission Date: 2022  Current Admission Diagnosis:Fall at home, initial encounter   Patient Active Problem List    Diagnosis Date Noted   • Traumatic rhabdomyolysis ([facility] ) 2022   • Fall at home, initial encounter 2022   • Atrial fibrillation with RVR ([facility] ) 2022   • Elevated lactic acid level 2022   • [facility] ([facility] 2022      LOS (days): 2  Geometric Mean LOS (GMLOS) (days): 3 30  Days to GMLOS:1 4     OBJECTIVE:  Risk of Unplanned Readmission Score: 8 7         Current admission status: Inpatient   Preferred Pharmacy:   [address] [place] [address]  Phone: [phone] Fax: [phone]    Primary Care Provider: Samuel Schwartz DO    Primary Insurance: [facility]  Secondary Insurance:     DISCHARGE DETAILS:        While CM in patients room seeing another patient, patient requested bedside commode referral after speaking with daughter on phone  CM submitted [place] request for bedside commode

## 2022-12-29 NOTE — PROGRESS NOTES
[address]  Progress Note Inessa Serrano 1940, [de-identified] y o  female MRN: [id number]  Unit/Bed#: -02 Encounter: [id number]  Primary Care Provider: Chun Olivas DO   Date and time admitted to hospital: 12/27/2022  5:30 PM    * Fall at home, initial encounter  [facility]  She had a mechanical fall at home where she tripped over a rug and was unable to stand up afterwards for almost 30 hours  she did not take any of her medications or eat or drink for that time  Finally was able to call 911 and her family  Will hydrate the patient and get PT OT eval   Fortunately trauma series is negative for any fractures  Bruising noted secondary to being on the floor on her left breast and her legs which do not appear to be too concerning however cont Eliquis     Atrial fibrillation with RVR ([facility] [address] )  Assessment & Plan  Presented with A  fib with RVR with heart rates in the 170s secondary to not taking her metoprolol for 30 hours  Start home dose of metoprolol 50 mg bid and placed on as needed IV Lopressor in case heart rate more than 120  12/28: Rates are still elevated in the 120s to 140s did require IV Lopressor and IV Cardizem boluses overnight  And IV Lopressor this morning as well  We will increase metoprolol to 75 mg twice daily 1 additional dose of IV Cardizem 15 mg ordered now  12/29: Still noted to have elevated heart rates this morning  Increased to Toprol- mg twice daily and added Cardizem 30 mg every 6  If heart rates are still uncontrolled will place on digoxin  Cardiology for consultation  Continue Eliquis for anticoagulation    Traumatic rhabdomyolysis [facility])  Assessment & Plan  fell at home and was on the ground for almost 30 hours    Noted to have CK total of 1293   placed on IV fluid hydration now resolved and off IV fluid hydration  renal function is normal so far  Seen by PT OT and recommend home with home health once medically cleared    Morbid obesity [facility])  Assessment & Plan  BMI is 41 2  Will  on diet exercise and lifestyle modification    Elevated lactic acid level  Assessment & Plan  Lactic acidosis of 3 5 noted secondary to dehydration and rhabdomyolysis poa  placed on IV fluids  Also noted to have leukocytosis secondary to dehydration but no evidence of infection at this time  VTE Pharmacologic Prophylaxis:   Pharmacologic: Apixaban (Eliquis)  Mechanical VTE Prophylaxis in Place: Yes    Patient Centered Rounds: I have performed bedside rounds with nursing staff today  Discussions with Specialists or Other Care Team Provider: Discussed with cardiology    Education and Discussions with Family / Patient: Cussed with patient at bedside and will update daughter    Time Spent for Care: 45 minutes  More than 50% of total time spent on counseling and coordination of care as described above  Current Length of Stay: 2 day(s)    Current Patient Status: Inpatient   Certification Statement: The patient will continue to require additional inpatient hospital stay due to aFib with RVR    Discharge Plan:stay in the hospital another 24 to 48 hours until heart rates are better controlled and then discharged home with home health    Code Status: Level 1 - Full Code      Subjective:   Patient complains of some soreness in her body otherwise is feeling little better  Also complains of palpitations that she is feeling intermittently  Objective:     Vitals:   Temp (24hrs), Av 2 °F (36 8 °C), Min:98 °F (36 7 °C), Max:98 5 °F (36 9 °C)    Temp:  [98 °F (36 7 °C)-98 5 °F (36 9 °C)] 98 5 °F (36 9 °C)  HR:  [] 120  Resp:  [18-20] 18  BP: (118-163)/() 163/108  SpO2:  [93 %-98 %] 98 %  Body mass index is 43 44 kg/m²  Input and Output Summary (last 24 hours):        Intake/Output Summary (Last 24 hours) at 2022 1056  Last data filed at 2022 0658  Gross per 24 hour   Intake 240 ml   Output 250 ml   Net -10 ml       Physical Exam: Physical Exam  Vitals and nursing note reviewed  Constitutional:       Appearance: Normal appearance  She is obese  HENT:      Head: Normocephalic and atraumatic  Right Ear: External ear normal       Left Ear: External ear normal       Nose: Nose normal       Mouth/Throat:      Pharynx: Oropharynx is clear  Eyes:      Pupils: Pupils are equal, round, and reactive to light  Cardiovascular:      Rate and Rhythm: Tachycardia present  Rhythm irregular  Heart sounds: Normal heart sounds  Pulmonary:      Effort: Pulmonary effort is normal       Breath sounds: Normal breath sounds  Abdominal:      General: Bowel sounds are normal       Palpations: Abdomen is soft  Tenderness: There is no abdominal tenderness  Musculoskeletal:         General: Normal range of motion  Cervical back: Normal range of motion and neck supple  Skin:     General: Skin is warm and dry  Capillary Refill: Capillary refill takes less than 2 seconds  Neurological:      General: No focal deficit present  Mental Status: She is alert and oriented to person, place, and time     Psychiatric:         Mood and Affect: Mood normal            Additional Data:     Labs:    Results from last 7 days   Lab Units 12/28/22  0514 12/27/22  1743   WBC Thousand/uL 22 13* 24 08*   HEMOGLOBIN g/dL 13 7 16 1*   HEMATOCRIT % 42 8 49 6*   PLATELETS Thousands/uL 195 243   NEUTROS PCT %  --  84*   LYMPHS PCT %  --  5*   MONOS PCT %  --  8   EOS PCT %  --  1     Results from last 7 days   Lab Units 12/29/22  0541   SODIUM mmol/L 143   POTASSIUM mmol/L 3 7   CHLORIDE mmol/L 107   CO2 mmol/L 26   BUN mg/dL 17   CREATININE mg/dL 0 70   ANION GAP mmol/L 10   CALCIUM mg/dL 8 4   ALBUMIN g/dL 2 8*   TOTAL BILIRUBIN mg/dL 1 61*   ALK PHOS U/L 37*   ALT U/L 29   AST U/L 33   GLUCOSE RANDOM mg/dL 111     Results from last 7 days   Lab Units 12/27/22  1743   INR  1 21*             Results from last 7 days   Lab Units 12/27/22  1949 12/27/22  1752   LACTIC ACID mmol/L 1 8 3 5*           * I Have Reviewed All Lab Data Listed Above  * Additional Pertinent Lab Tests Reviewed: [address] Admission Reviewed    Imaging:    Imaging Reports Reviewed Today Include: cT chest  Imaging Personally Reviewed by Myself Includes: CT chest    Recent Cultures (last 7 days):     Results from last 7 days   Lab Units 12/27/22  1816 12/27/22  1750   BLOOD CULTURE  No Growth at 24 hrs  No Growth at 24 hrs  Last 24 Hours Medication List:   Current Facility-Administered Medications   Medication Dose Route Frequency Provider Last Rate   • acetaminophen  650 mg Oral Q6H PRN Nelda Griffin MD     • apixaban  5 mg Oral BID Nelda Griffin MD     • diltiazem  30 mg Oral Q6H [address] Nelda Griffin MD     • docusate sodium  100 mg Oral BID Nelda Griffin MD     • metoprolol  5 mg Intravenous Q6H PRN Nelda Griffin MD     • metoprolol succinate  100 mg Oral BID [place] JOAQUÍN Biggs     • ondansetron  4 mg Intravenous Q6H PRN Nelda Griffin MD          Today, Patient Was Seen By: Nelda Griffin MD    ** Please Note: Dictation voice to text software may have been used in the creation of this document   **

## 2022-12-29 NOTE — PLAN OF CARE
Problem: Potential for Falls  Goal: Patient will remain free of falls  Description: INTERVENTIONS:  - Educate patient/family on patient safety including physical limitations  - Instruct patient to call for assistance with activity   - Consult OT/PT to assist with strengthening/mobility   - Keep Call bell within reach  - Keep bed low and locked with side rails adjusted as appropriate  - Keep care items and personal belongings within reach  - Initiate and maintain comfort rounds  - Make Fall Risk Sign visible to staff    - Apply yellow socks and bracelet for high fall risk patients  - Consider moving patient to room near nurses station  Outcome: Progressing     Problem: MOBILITY - ADULT  Goal: Maintain or return to baseline ADL function  Description: INTERVENTIONS:  -  Assess patient's ability to carry out ADLs; assess patient's baseline for ADL function and identify physical deficits which impact ability to perform ADLs (bathing, care of mouth/teeth, toileting, grooming, dressing, etc )  - Assess/evaluate cause of self-care deficits   - Assess range of motion  - Assess patient's mobility; develop plan if impaired  - Assess patient's need for assistive devices and provide as appropriate  - Encourage maximum independence but intervene and supervise when necessary  - Involve family in performance of ADLs  - Assess for home care needs following discharge   - Consider OT consult to assist with ADL evaluation and planning for discharge  - Provide patient education as appropriate  Outcome: Progressing  Goal: Maintains/Returns to pre admission functional level  Description: INTERVENTIONS:  - Perform BMAT or MOVE assessment daily    - Set and communicate daily mobility goal to care team and patient/family/caregiver     - Collaborate with rehabilitation services on mobility goals if consulted    - Out of bed for toileting  - Record patient progress and toleration of activity level   Outcome: Progressing     Problem: PAIN - ADULT  Goal: Verbalizes/displays adequate comfort level or baseline comfort level  Description: Interventions:  - Encourage patient to monitor pain and request assistance  - Assess pain using appropriate pain scale  - Administer analgesics based on type and severity of pain and evaluate response  - Implement non-pharmacological measures as appropriate and evaluate response  - Consider cultural and social influences on pain and pain management  - Notify physician/advanced practitioner if interventions unsuccessful or patient reports new pain  Outcome: Progressing     Problem: INFECTION - ADULT  Goal: Absence or prevention of progression during hospitalization  Description: INTERVENTIONS:  - Assess and monitor for signs and symptoms of infection  - Monitor lab/diagnostic results  - Monitor all insertion sites, i e  indwelling lines, tubes, and drains  - Monitor endotracheal if appropriate and nasal secretions for changes in amount and color  - [place] appropriate cooling/warming therapies per order  - Administer medications as ordered  - Instruct and encourage patient and family to use good hand hygiene technique  - Identify and instruct in appropriate isolation precautions for identified infection/condition  Outcome: Progressing     Problem: SAFETY ADULT  Goal: Patient will remain free of falls  Description: INTERVENTIONS:  - Educate patient/family on patient safety including physical limitations  - Instruct patient to call for assistance with activity   - Consult OT/PT to assist with strengthening/mobility   - Keep Call bell within reach  - Keep bed low and locked with side rails adjusted as appropriate  - Keep care items and personal belongings within reach  - Initiate and maintain comfort rounds  - Make Fall Risk Sign visible to staff    - Apply yellow socks and bracelet for high fall risk patients  - Consider moving patient to room near nurses station  Outcome: Progressing  Goal: Maintain or return to baseline ADL function  Description: INTERVENTIONS:  -  Assess patient's ability to carry out ADLs; assess patient's baseline for ADL function and identify physical deficits which impact ability to perform ADLs (bathing, care of mouth/teeth, toileting, grooming, dressing, etc )  - Assess/evaluate cause of self-care deficits   - Assess range of motion  - Assess patient's mobility; develop plan if impaired  - Assess patient's need for assistive devices and provide as appropriate  - Encourage maximum independence but intervene and supervise when necessary  - Involve family in performance of ADLs  - Assess for home care needs following discharge   - Consider OT consult to assist with ADL evaluation and planning for discharge  - Provide patient education as appropriate  Outcome: Progressing  Goal: Maintains/Returns to pre admission functional level  Description: INTERVENTIONS:  - Perform BMAT or MOVE assessment daily    - Set and communicate daily mobility goal to care team and patient/family/caregiver     - Collaborate with rehabilitation services on mobility goals if consulted    - Out of bed for toileting  - Record patient progress and toleration of activity level   Outcome: Progressing     Problem: DISCHARGE PLANNING  Goal: Discharge to home or other facility with appropriate resources  Description: INTERVENTIONS:  - Identify barriers to discharge w/patient and caregiver  - Arrange for needed discharge resources and transportation as appropriate  - Identify discharge learning needs (meds, wound care, etc )  - Arrange for interpretive services to assist at discharge as needed  - Refer to Case Management Department for coordinating discharge planning if the patient needs post-hospital services based on physician/advanced practitioner order or complex needs related to functional status, cognitive ability, or social support system  Outcome: Progressing     Problem: Knowledge Deficit  Goal: Patient/family/caregiver demonstrates understanding of disease process, treatment plan, medications, and discharge instructions  Description: Complete learning assessment and assess knowledge base    Interventions:  - Provide teaching at level of understanding  - Provide teaching via preferred learning methods  Outcome: Progressing

## 2022-12-30 VITALS
HEIGHT: 60 IN | SYSTOLIC BLOOD PRESSURE: 143 MMHG | OXYGEN SATURATION: 95 % | BODY MASS INDEX: 42.68 KG/M2 | TEMPERATURE: 97.9 F | DIASTOLIC BLOOD PRESSURE: 89 MMHG | RESPIRATION RATE: 20 BRPM | HEART RATE: 94 BPM | WEIGHT: 217.4 LBS

## 2022-12-30 LAB
DME PARACHUTE DELIVERY DATE ACTUAL: NORMAL
DME PARACHUTE DELIVERY DATE REQUESTED: NORMAL
DME PARACHUTE ITEM DESCRIPTION: NORMAL
DME PARACHUTE ORDER STATUS: NORMAL
DME PARACHUTE SUPPLIER NAME: NORMAL
DME PARACHUTE SUPPLIER PHONE: NORMAL
ERYTHROCYTE [DISTWIDTH] IN BLOOD BY AUTOMATED COUNT: 13.3 % (ref 11.6–15.1)
HCT VFR BLD AUTO: 41.1 % (ref 34.8–46.1)
HGB BLD-MCNC: 13.1 G/DL (ref 11.5–15.4)
MCH RBC QN AUTO: 30.7 PG (ref 26.8–34.3)
MCHC RBC AUTO-ENTMCNC: 31.9 G/DL (ref 31.4–37.4)
MCV RBC AUTO: 96 FL (ref 82–98)
PLATELET # BLD AUTO: 187 THOUSANDS/UL (ref 149–390)
PMV BLD AUTO: 11.3 FL (ref 8.9–12.7)
PROCALCITONIN SERPL-MCNC: 0.06 NG/ML
RBC # BLD AUTO: 4.27 MILLION/UL (ref 3.81–5.12)
WBC # BLD AUTO: 12.23 THOUSAND/UL (ref 4.31–10.16)

## 2022-12-30 RX ORDER — DILTIAZEM HYDROCHLORIDE 120 MG/1
120 CAPSULE, COATED, EXTENDED RELEASE ORAL DAILY
Status: DISCONTINUED | OUTPATIENT
Start: 2022-12-30 | End: 2022-12-30 | Stop reason: HOSPADM

## 2022-12-30 RX ORDER — DILTIAZEM HYDROCHLORIDE 120 MG/1
120 CAPSULE, COATED, EXTENDED RELEASE ORAL DAILY
Qty: 30 CAPSULE | Refills: 0 | Status: SHIPPED | OUTPATIENT
Start: 2022-12-31 | End: 2023-01-30

## 2022-12-30 RX ORDER — ACETAMINOPHEN 325 MG/1
650 TABLET ORAL EVERY 6 HOURS PRN
Refills: 0
Start: 2022-12-30

## 2022-12-30 RX ORDER — FUROSEMIDE 10 MG/ML
20 INJECTION INTRAMUSCULAR; INTRAVENOUS ONCE
Status: COMPLETED | OUTPATIENT
Start: 2022-12-30 | End: 2022-12-30

## 2022-12-30 RX ORDER — DOCUSATE SODIUM 100 MG/1
100 CAPSULE, LIQUID FILLED ORAL 2 TIMES DAILY
Qty: 60 CAPSULE | Refills: 0 | Status: SHIPPED | OUTPATIENT
Start: 2022-12-30 | End: 2023-01-29

## 2022-12-30 RX ORDER — METOPROLOL SUCCINATE 100 MG/1
100 TABLET, EXTENDED RELEASE ORAL 2 TIMES DAILY
Qty: 60 TABLET | Refills: 0 | Status: SHIPPED | OUTPATIENT
Start: 2022-12-30 | End: 2023-01-29

## 2022-12-30 RX ADMIN — METOPROLOL SUCCINATE 100 MG: 100 TABLET, EXTENDED RELEASE ORAL at 09:16

## 2022-12-30 RX ADMIN — APIXABAN 5 MG: 5 TABLET, FILM COATED ORAL at 09:16

## 2022-12-30 RX ADMIN — FUROSEMIDE 20 MG: 10 INJECTION, SOLUTION INTRAMUSCULAR; INTRAVENOUS at 12:17

## 2022-12-30 RX ADMIN — DILTIAZEM HYDROCHLORIDE 30 MG: 30 TABLET, FILM COATED ORAL at 05:44

## 2022-12-30 RX ADMIN — DILTIAZEM HYDROCHLORIDE 120 MG: 120 CAPSULE, COATED, EXTENDED RELEASE ORAL at 10:45

## 2022-12-30 NOTE — PLAN OF CARE
Problem: Potential for Falls  Goal: Patient will remain free of falls  Description: INTERVENTIONS:  - Educate patient/family on patient safety including physical limitations  - Instruct patient to call for assistance with activity   - Consult OT/PT to assist with strengthening/mobility   - Keep Call bell within reach  - Keep bed low and locked with side rails adjusted as appropriate  - Keep care items and personal belongings within reach  - Initiate and maintain comfort rounds  - Make Fall Risk Sign visible to staff    - Apply yellow socks and bracelet for high fall risk patients  - Consider moving patient to room near nurses station  12/30/2022 1350 by Colletta Geralds, RN  Outcome: Adequate for Discharge  12/30/2022 1149 by Colletta Geralds, RN  Outcome: Progressing     Problem: MOBILITY - ADULT  Goal: Maintain or return to baseline ADL function  Description: INTERVENTIONS:  -  Assess patient's ability to carry out ADLs; assess patient's baseline for ADL function and identify physical deficits which impact ability to perform ADLs (bathing, care of mouth/teeth, toileting, grooming, dressing, etc )  - Assess/evaluate cause of self-care deficits   - Assess range of motion  - Assess patient's mobility; develop plan if impaired  - Assess patient's need for assistive devices and provide as appropriate  - Encourage maximum independence but intervene and supervise when necessary  - Involve family in performance of ADLs  - Assess for home care needs following discharge   - Consider OT consult to assist with ADL evaluation and planning for discharge  - Provide patient education as appropriate  12/30/2022 1350 by Colletta Geralds, RN  Outcome: Adequate for Discharge  12/30/2022 1149 by Colletta Geralds, RN  Outcome: Progressing  Goal: Maintains/Returns to pre admission functional level  Description: INTERVENTIONS:  - Perform BMAT or MOVE assessment daily    - Set and communicate daily mobility goal to care team and patient/family/caregiver     - Collaborate with rehabilitation services on mobility goals if consulted    - Out of bed for toileting  - Record patient progress and toleration of activity level   12/30/2022 1350 by Jem Ferguson RN  Outcome: Adequate for Discharge  12/30/2022 1149 by Jem Ferguson RN  Outcome: Progressing     Problem: PAIN - ADULT  Goal: Verbalizes/displays adequate comfort level or baseline comfort level  Description: Interventions:  - Encourage patient to monitor pain and request assistance  - Assess pain using appropriate pain scale  - Administer analgesics based on type and severity of pain and evaluate response  - Implement non-pharmacological measures as appropriate and evaluate response  - Consider cultural and social influences on pain and pain management  - Notify physician/advanced practitioner if interventions unsuccessful or patient reports new pain  12/30/2022 1350 by Jem Ferguson RN  Outcome: Adequate for Discharge  12/30/2022 1149 by Jem Ferguson RN  Outcome: Progressing     Problem: INFECTION - ADULT  Goal: Absence or prevention of progression during hospitalization  Description: INTERVENTIONS:  - Assess and monitor for signs and symptoms of infection  - Monitor lab/diagnostic results  - Monitor all insertion sites, i e  indwelling lines, tubes, and drains  - Monitor endotracheal if appropriate and nasal secretions for changes in amount and color  - [place] appropriate cooling/warming therapies per order  - Administer medications as ordered  - Instruct and encourage patient and family to use good hand hygiene technique  - Identify and instruct in appropriate isolation precautions for identified infection/condition  12/30/2022 1350 by Jem Ferguson RN  Outcome: Adequate for Discharge  12/30/2022 1149 by Jem Ferguson RN  Outcome: Progressing     Problem: SAFETY ADULT  Goal: Patient will remain free of falls  Description: INTERVENTIONS:  - Educate patient/family on patient safety including physical limitations  - Instruct patient to call for assistance with activity   - Consult OT/PT to assist with strengthening/mobility   - Keep Call bell within reach  - Keep bed low and locked with side rails adjusted as appropriate  - Keep care items and personal belongings within reach  - Initiate and maintain comfort rounds    - Apply yellow socks and bracelet for high fall risk patients  - Consider moving patient to room near nurses station  12/30/2022 1350 by Chepe Molina RN  Outcome: Adequate for Discharge  12/30/2022 1149 by Chepe Molina RN  Outcome: Progressing  Goal: Maintain or return to baseline ADL function  Description: INTERVENTIONS:  -  Assess patient's ability to carry out ADLs; assess patient's baseline for ADL function and identify physical deficits which impact ability to perform ADLs (bathing, care of mouth/teeth, toileting, grooming, dressing, etc )  - Assess/evaluate cause of self-care deficits   - Assess range of motion  - Assess patient's mobility; develop plan if impaired  - Assess patient's need for assistive devices and provide as appropriate  - Encourage maximum independence but intervene and supervise when necessary  - Involve family in performance of ADLs  - Assess for home care needs following discharge   - Consider OT consult to assist with ADL evaluation and planning for discharge  - Provide patient education as appropriate  12/30/2022 1350 by Chepe Molina RN  Outcome: Adequate for Discharge  12/30/2022 1149 by Chepe Molina RN  Outcome: Progressing  Goal: Maintains/Returns to pre admission functional level  Description: INTERVENTIONS:  - Perform BMAT or MOVE assessment daily    - Set and communicate daily mobility goal to care team and patient/family/caregiver     - Collaborate with rehabilitation services on mobility goals if consulted    - Out of bed for toileting  - Record patient progress and toleration of activity level   12/30/2022 1350 by Inder Benavides RN  Outcome: Adequate for Discharge  12/30/2022 1149 by Inder Benavides RN  Outcome: Progressing     Problem: DISCHARGE PLANNING  Goal: Discharge to home or other facility with appropriate resources  Description: INTERVENTIONS:  - Identify barriers to discharge w/patient and caregiver  - Arrange for needed discharge resources and transportation as appropriate  - Identify discharge learning needs (meds, wound care, etc )  - Arrange for interpretive services to assist at discharge as needed  - Refer to Case Management Department for coordinating discharge planning if the patient needs post-hospital services based on physician/advanced practitioner order or complex needs related to functional status, cognitive ability, or social support system  12/30/2022 1350 by Inder Benavides RN  Outcome: Adequate for Discharge  12/30/2022 1149 by Inder Benavides RN  Outcome: Progressing     Problem: Knowledge Deficit  Goal: Patient/family/caregiver demonstrates understanding of disease process, treatment plan, medications, and discharge instructions  Description: Complete learning assessment and assess knowledge base    Interventions:  - Provide teaching at level of understanding  - Provide teaching via preferred learning methods  12/30/2022 1350 by Inder Benavides RN  Outcome: Adequate for Discharge  12/30/2022 1149 by Inder Benavides RN  Outcome: Progressing     Problem: Prexisting or High Potential for Compromised Skin Integrity  Goal: Skin integrity is maintained or improved  Description: INTERVENTIONS:  - Identify patients at risk for skin breakdown  - Assess and monitor skin integrity  - Assess and monitor nutrition and hydration status  - Monitor labs   - Assess for incontinence   - Turn and reposition patient  - Assist with mobility/ambulation  - Relieve pressure over bony prominences  - Avoid friction and shearing  - Provide appropriate hygiene as needed including keeping skin clean and dry  - Evaluate need for skin moisturizer/barrier cream  - Collaborate with interdisciplinary team   - Patient/family teaching  - Consider wound care consult   12/30/2022 1350 by Kirsten Noble RN  Outcome: Adequate for Discharge  12/30/2022 1149 by Kirsten Noble RN  Outcome: Progressing

## 2022-12-30 NOTE — PLAN OF CARE
Problem: Potential for Falls  Goal: Patient will remain free of falls  Description: INTERVENTIONS:  - Educate patient/family on patient safety including physical limitations  - Instruct patient to call for assistance with activity   - Consult OT/PT to assist with strengthening/mobility   - Keep Call bell within reach  - Keep bed low and locked with side rails adjusted as appropriate  - Keep care items and personal belongings within reach  - Initiate and maintain comfort rounds  - Make Fall Risk Sign visible to staff    - Apply yellow socks and bracelet for high fall risk patients  - Consider moving patient to room near nurses station  12/30/2022 1350 by Julee Woodruff RN  Outcome: Adequate for Discharge  12/30/2022 1149 by Julee Woodruff RN  Outcome: Progressing     Problem: MOBILITY - ADULT  Goal: Maintain or return to baseline ADL function  Description: INTERVENTIONS:  -  Assess patient's ability to carry out ADLs; assess patient's baseline for ADL function and identify physical deficits which impact ability to perform ADLs (bathing, care of mouth/teeth, toileting, grooming, dressing, etc )  - Assess/evaluate cause of self-care deficits   - Assess range of motion  - Assess patient's mobility; develop plan if impaired  - Assess patient's need for assistive devices and provide as appropriate  - Encourage maximum independence but intervene and supervise when necessary  - Involve family in performance of ADLs  - Assess for home care needs following discharge   - Consider OT consult to assist with ADL evaluation and planning for discharge  - Provide patient education as appropriate  12/30/2022 1350 by Julee Woodruff RN  Outcome: Adequate for Discharge  12/30/2022 1149 by Julee Woodruff RN  Outcome: Progressing  Goal: Maintains/Returns to pre admission functional level  Description: INTERVENTIONS:  - Perform BMAT or MOVE assessment daily    - Set and communicate daily mobility goal to care team and patient/family/caregiver     - Collaborate with rehabilitation services on mobility goals if consulted    - Out of bed for toileting  - Record patient progress and toleration of activity level   12/30/2022 1350 by Destiny Xiao RN  Outcome: Adequate for Discharge  12/30/2022 1149 by Destiny Xiao RN  Outcome: Progressing     Problem: PAIN - ADULT  Goal: Verbalizes/displays adequate comfort level or baseline comfort level  Description: Interventions:  - Encourage patient to monitor pain and request assistance  - Assess pain using appropriate pain scale  - Administer analgesics based on type and severity of pain and evaluate response  - Implement non-pharmacological measures as appropriate and evaluate response  - Consider cultural and social influences on pain and pain management  - Notify physician/advanced practitioner if interventions unsuccessful or patient reports new pain  12/30/2022 1350 by Destiny Xiao RN  Outcome: Adequate for Discharge  12/30/2022 1149 by Destiny Xiao RN  Outcome: Progressing     Problem: INFECTION - ADULT  Goal: Absence or prevention of progression during hospitalization  Description: INTERVENTIONS:  - Assess and monitor for signs and symptoms of infection  - Monitor lab/diagnostic results  - Monitor all insertion sites, i e  indwelling lines, tubes, and drains  - Monitor endotracheal if appropriate and nasal secretions for changes in amount and color  - [place] appropriate cooling/warming therapies per order  - Administer medications as ordered  - Instruct and encourage patient and family to use good hand hygiene technique  - Identify and instruct in appropriate isolation precautions for identified infection/condition  12/30/2022 1350 by Destiny Xiao RN  Outcome: Adequate for Discharge  12/30/2022 1149 by Destiny Xiao RN  Outcome: Progressing     Problem: SAFETY ADULT  Goal: Patient will remain free of falls  Description: INTERVENTIONS:  - Educate patient/family on patient safety including physical limitations  - Instruct patient to call for assistance with activity   - Consult OT/PT to assist with strengthening/mobility   - Keep Call bell within reach  - Keep bed low and locked with side rails adjusted as appropriate  - Keep care items and personal belongings within reach  - Initiate and maintain comfort rounds  - Make Fall Risk Sign visible to staff    - Apply yellow socks and bracelet for high fall risk patients  - Consider moving patient to room near nurses station  12/30/2022 1350 by Raj Montes RN  Outcome: Adequate for Discharge  12/30/2022 1149 by Raj Montes RN  Outcome: Progressing  Goal: Maintain or return to baseline ADL function  Description: INTERVENTIONS:  -  Assess patient's ability to carry out ADLs; assess patient's baseline for ADL function and identify physical deficits which impact ability to perform ADLs (bathing, care of mouth/teeth, toileting, grooming, dressing, etc )  - Assess/evaluate cause of self-care deficits   - Assess range of motion  - Assess patient's mobility; develop plan if impaired  - Assess patient's need for assistive devices and provide as appropriate  - Encourage maximum independence but intervene and supervise when necessary  - Involve family in performance of ADLs  - Assess for home care needs following discharge   - Consider OT consult to assist with ADL evaluation and planning for discharge  - Provide patient education as appropriate  12/30/2022 1350 by Raj Montes RN  Outcome: Adequate for Discharge  12/30/2022 1149 by Raj Montes RN  Outcome: Progressing  Goal: Maintains/Returns to pre admission functional level  Description: INTERVENTIONS:  - Perform BMAT or MOVE assessment daily    - Set and communicate daily mobility goal to care team and patient/family/caregiver     - Collaborate with rehabilitation services on mobility goals if consulted    - Out of bed for toileting  - Record patient progress and toleration of activity level   12/30/2022 1350 by Elizabeth Drake RN  Outcome: Adequate for Discharge  12/30/2022 1149 by Elizabeth Drake RN  Outcome: Progressing     Problem: DISCHARGE PLANNING  Goal: Discharge to home or other facility with appropriate resources  Description: INTERVENTIONS:  - Identify barriers to discharge w/patient and caregiver  - Arrange for needed discharge resources and transportation as appropriate  - Identify discharge learning needs (meds, wound care, etc )  - Arrange for interpretive services to assist at discharge as needed  - Refer to Case Management Department for coordinating discharge planning if the patient needs post-hospital services based on physician/advanced practitioner order or complex needs related to functional status, cognitive ability, or social support system  12/30/2022 1350 by Elizabeth Drake RN  Outcome: Adequate for Discharge  12/30/2022 1149 by Elizabeth Drake RN  Outcome: Progressing     Problem: Knowledge Deficit  Goal: Patient/family/caregiver demonstrates understanding of disease process, treatment plan, medications, and discharge instructions  Description: Complete learning assessment and assess knowledge base    Interventions:  - Provide teaching at level of understanding  - Provide teaching via preferred learning methods  12/30/2022 1350 by Elizbaeth Drake RN  Outcome: Adequate for Discharge  12/30/2022 1149 by Elizabeth Drake RN  Outcome: Progressing     Problem: Prexisting or High Potential for Compromised Skin Integrity  Goal: Skin integrity is maintained or improved  Description: INTERVENTIONS:  - Identify patients at risk for skin breakdown  - Assess and monitor skin integrity  - Assess and monitor nutrition and hydration status  - Monitor labs   - Assess for incontinence   - Turn and reposition patient  - Assist with mobility/ambulation  - Relieve pressure over bony prominences  - Avoid friction and shearing  - Provide appropriate hygiene as needed including keeping skin clean and dry  - Evaluate need for skin moisturizer/barrier cream  - Collaborate with interdisciplinary team   - Patient/family teaching  - Consider wound care consult   12/30/2022 1350 by Raj Montes RN  Outcome: Adequate for Discharge  12/30/2022 1149 by Raj Montes RN  Outcome: Progressing

## 2022-12-30 NOTE — CASE MANAGEMENT
Case Management Discharge Planning Note    Patient name Remigio Madison  Location [address] 340/-40 MRN [id number]  : 1940 Date 2022       Current Admission Date: 2022  Current Admission Diagnosis:Fall at home, initial encounter   Patient Active Problem List    Diagnosis Date Noted   • Traumatic rhabdomyolysis ([facility] ) 2022   • Fall at home, initial encounter 2022   • Atrial fibrillation with RVR ([facility] ) 2022   • Elevated lactic acid level 2022   • Morbid obesity ([facility] ) 2022      LOS (days): 3  Geometric Mean LOS (GMLOS) (days): 3 30  Days to GMLOS:0 4     OBJECTIVE:  Risk of Unplanned Readmission Score: 9 28         Current admission status: Inpatient   Preferred Pharmacy:   [address] [place] [address]  Phone: [phone] Fax: [phone]    Primary Care Provider: Sina Laboy DO    Primary Insurance: [employer] [address] [employer]  Secondary Insurance:     DISCHARGE DETAILS:           AVS to [facility]

## 2022-12-30 NOTE — NURSING NOTE
Patient discharged home today; IV removed intact, no bleeding noted; belongings reviewed with patient and returned to include bedside commode and walker ordered for patient; AVS printed and reviewed with patient, patient verbalizes understanding; patient transported to exit via wheelchair accompanied by daughter and PCA

## 2022-12-30 NOTE — ASSESSMENT & PLAN NOTE
She had a mechanical fall at home where she tripped over a rug and was unable to stand up afterwards for almost 30 hours  she did not take any of her medications or eat or drink for that time  Finally was able to call 911 and her family    Will hydrate the patient and get PT OT eval   Fortunately trauma series is negative for any fractures  Bruising noted secondary to being on the floor on her left breast and her legs   cont Eliquis

## 2022-12-30 NOTE — PLAN OF CARE
Problem: Potential for Falls  Goal: Patient will remain free of falls  Description: INTERVENTIONS:  - Educate patient/family on patient safety including physical limitations  - Instruct patient to call for assistance with activity   - Consult OT/PT to assist with strengthening/mobility   - Keep Call bell within reach  - Keep bed low and locked with side rails adjusted as appropriate  - Keep care items and personal belongings within reach  - Initiate and maintain comfort rounds  - Make Fall Risk Sign visible to staff    - Apply yellow socks and bracelet for high fall risk patients  - Consider moving patient to room near nurses station  Outcome: Progressing     Problem: MOBILITY - ADULT  Goal: Maintain or return to baseline ADL function  Description: INTERVENTIONS:  -  Assess patient's ability to carry out ADLs; assess patient's baseline for ADL function and identify physical deficits which impact ability to perform ADLs (bathing, care of mouth/teeth, toileting, grooming, dressing, etc )  - Assess/evaluate cause of self-care deficits   - Assess range of motion  - Assess patient's mobility; develop plan if impaired  - Assess patient's need for assistive devices and provide as appropriate  - Encourage maximum independence but intervene and supervise when necessary  - Involve family in performance of ADLs  - Assess for home care needs following discharge   - Consider OT consult to assist with ADL evaluation and planning for discharge  - Provide patient education as appropriate  Outcome: Progressing     Problem: PAIN - ADULT  Goal: Verbalizes/displays adequate comfort level or baseline comfort level  Description: Interventions:  - Encourage patient to monitor pain and request assistance  - Assess pain using appropriate pain scale  - Administer analgesics based on type and severity of pain and evaluate response  - Implement non-pharmacological measures as appropriate and evaluate response  - Consider cultural and social influences on pain and pain management  - Notify physician/advanced practitioner if interventions unsuccessful or patient reports new pain  Outcome: Progressing

## 2022-12-30 NOTE — CASE MANAGEMENT
Case Management Discharge Planning Note    Patient name Alexandra Stewart  Location [address] 340/-15 MRN [id number]  : 1940 Date 2022       Current Admission Date: 2022  Current Admission Diagnosis:Fall at home, initial encounter   Patient Active Problem List    Diagnosis Date Noted   • Traumatic rhabdomyolysis ([facility] ) 2022   • Fall at home, initial encounter 2022   • Atrial fibrillation with RVR ([facility] ) 2022   • Elevated lactic acid level 2022   • Morbid obesity ([facility] ) 2022      LOS (days): 3  Geometric Mean LOS (GMLOS) (days): 3 30  Days to GMLOS:0 7     OBJECTIVE:  Risk of Unplanned Readmission Score: 8 82         Current admission status: Inpatient   Preferred Pharmacy:   [address] [place] [address]  Phone: [phone] Fax: [phone]    Primary Care Provider: Latonia Alvarez DO    Primary Insurance: [facility]  Secondary Insurance:     DISCHARGE DETAILS:        CM met with patient and delivered bedside commode               DME Referral Provided  Referral made for DME?: Yes  DME referral completed for the following items[de-identified] Bedside Commode  DME Supplier Name[de-identified] [employer]

## 2022-12-30 NOTE — ASSESSMENT & PLAN NOTE
Presented with A  fib with RVR with heart rates in the 170s secondary to not taking her metoprolol for 30 hours  Start home dose of metoprolol 50 mg bid and placed on as needed IV Lopressor in case heart rate more than 120  12/28: Rates are still elevated in the 120s to 140s did require IV Lopressor and IV Cardizem boluses overnight  And IV Lopressor this morning as well  We will increase metoprolol to 75 mg twice daily 1 additional dose of IV Cardizem 15 mg ordered now  12/29: Still noted to have elevated heart rates this morning  Increased to Toprol- mg twice daily and added Cardizem 30 mg every 6    12/30: Rates are to be better controlled today  Change Cardizem to long-acting    If heart rates remain controlled today we will discharge home later today with outpatient follow-up with cardiology  Appreciate Cardiology consultation  Continue Eliquis for anticoagulation

## 2022-12-30 NOTE — PLAN OF CARE
Problem: Potential for Falls  Goal: Patient will remain free of falls  Description: INTERVENTIONS:  - Educate patient/family on patient safety including physical limitations  - Instruct patient to call for assistance with activity   - Consult OT/PT to assist with strengthening/mobility   - Keep Call bell within reach  - Keep bed low and locked with side rails adjusted as appropriate  - Keep care items and personal belongings within reach  - Initiate and maintain comfort rounds  - Make Fall Risk Sign visible to staff  Problem: MOBILITY - ADULT  Goal: Maintain or return to baseline ADL function  Description: INTERVENTIONS:  -  Assess patient's ability to carry out ADLs; assess patient's baseline for ADL function and identify physical deficits which impact ability to perform ADLs (bathing, care of mouth/teeth, toileting, grooming, dressing, etc )  - Assess/evaluate cause of self-care deficits   - Assess range of motion  - Assess patient's mobility; develop plan if impaired  - Assess patient's need for assistive devices and provide as appropriate  - Encourage maximum independence but intervene and supervise when necessary  - Involve family in performance of ADLs  - Assess for home care needs following discharge   - Consider OT consult to assist with ADL evaluation and planning for discharge  - Provide patient education as appropriate  Outcome: Progressing  Goal: Maintains/Returns to pre admission functional level  Description: INTERVENTIONS:  - Perform BMAT or MOVE assessment daily    - Set and communicate daily mobility goal to care team and patient/family/caregiver     - Collaborate with rehabilitation services on mobility goals if consulted  Problem: INFECTION - ADULT  Goal: Absence or prevention of progression during hospitalization  Description: INTERVENTIONS:  - Assess and monitor for signs and symptoms of infection  - Monitor lab/diagnostic results  - Monitor all insertion sites, i e  indwelling lines, tubes, and drains  - Monitor endotracheal if appropriate and nasal secretions for changes in amount and color  - [place] appropriate cooling/warming therapies per order  - Administer medications as ordered  - Instruct and encourage patient and family to use good hand hygiene technique  - Identify and instruct in appropriate isolation precautions for identified infection/condition  Outcome: Progressing     - Record patient progress and toleration of activity level   Outcome: Progressing     Problem: PAIN - ADULT  Goal: Verbalizes/displays adequate comfort level or baseline comfort level  Description: Interventions:  - Encourage patient to monitor pain and request assistance  - Assess pain using appropriate pain scale  - Administer analgesics based on type and severity of pain and evaluate response  - Implement non-pharmacological measures as appropriate and evaluate response  - Consider cultural and social influences on pain and pain management  - Notify physician/advanced practitioner if interventions unsuccessful or patient reports new pain  Outcome: Progressing     - Apply yellow socks and bracelet for high fall risk patients  - Consider moving patient to room near nurses station  Outcome: Progressing

## 2022-12-30 NOTE — PROGRESS NOTES
Progress Note - Cardiology   Cherylynn Gottron [de-identified] y o  female MRN: [id number]  Unit/Bed#: -02 Encounter: [id number]    Assessment:  Mechanical fall at home with inability to ambulate for 30 hours              - CK elevation and evidence of rhabdo              - renal function normal  Afib with rvr- patient with known chronic afib on Eliquis, did not get BB for 30 hours and likely rates elevated to some degree from dehydration and rhabdo   - now improved  Obesity  Elevated lactate level- normalized  Normal LV function     Plan:  1  Recommend oral hydration  2  Switch to Cardizem  daily    3  Continue Toprol xl 100 mg PO BID   4  Patient has had afib for over a year  Chances of restoring normal sinus are low  LA is moderately dilated  5  Continue anticoagulation  Will sign off    Subjective/Objective     Subjective: patient reports no chest pain overnight  Reports a lot of pain in her joints from her fall  She reports this pain is making her HR go up  She has no real palpitations  Objective: HR elevated overnight   Started on oral cardizem this AM     Vitals: BP (!) 137/103 (BP Location: Left arm)   Pulse 94   Temp 97 9 °F (36 6 °C) (Oral)   Resp 22   Ht 5' (1 524 m)   Wt 98 6 kg (217 lb 6 4 oz)   SpO2 99%   BMI 42 46 kg/m²   Vitals:    12/29/22 0600 12/30/22 0600   Weight: 101 kg (222 lb 7 1 oz) 98 6 kg (217 lb 6 4 oz)     Orthostatic Blood Pressures    Flowsheet Row Most Recent Value   Blood Pressure 137/103 filed at 12/30/2022 4329   Patient Position - Orthostatic VS Lying filed at 12/30/2022 0000            Intake/Output Summary (Last 24 hours) at 12/30/2022 0858  Last data filed at 12/30/2022 4080  Gross per 24 hour   Intake 420 ml   Output --   Net 420 ml       Invasive Devices     Peripheral Intravenous Line  Duration           Peripheral IV 12/27/22 Left Antecubital 2 days    Peripheral IV 12/27/22 Left;Ventral (anterior) Hand 2 days                  Physical Exam  Vitals and nursing note reviewed  Constitutional:       Appearance: Normal appearance  HENT:      Head: Normocephalic and atraumatic  Cardiovascular:      Rate and Rhythm: Rhythm irregular  Heart sounds: No murmur heard  Pulmonary:      Effort: Pulmonary effort is normal       Breath sounds: No wheezing  Abdominal:      Palpations: Abdomen is soft  Musculoskeletal:         General: Swelling present  Cervical back: Neck supple  Skin:     Capillary Refill: Capillary refill takes less than 2 seconds  Neurological:      General: No focal deficit present  Mental Status: She is alert and oriented to person, place, and time  Psychiatric:         Mood and Affect: Mood normal          Thought Content: Thought content normal      Lab Results:   I have personally reviewed pertinent lab results  CBC with diff:   Results from last 7 days   Lab Units 12/30/22  0754   WBC Thousand/uL 12 23*   RBC Million/uL 4 27   HEMOGLOBIN g/dL 13 1   HEMATOCRIT % 41 1   MCV fL 96   MCH pg 30 7   MCHC g/dL 31 9   RDW % 13 3   MPV fL 11 3   PLATELETS Thousands/uL 187     CMP:   Results from last 7 days   Lab Units 12/29/22  0541   SODIUM mmol/L 143   CHLORIDE mmol/L 107   CO2 mmol/L 26   BUN mg/dL 17   CREATININE mg/dL 0 70   CALCIUM mg/dL 8 4   AST U/L 33   ALT U/L 29   ALK PHOS U/L 37*   EGFR ml/min/1 73sq m 82     HS Troponin:   0   Lab Value Date/Time    HSTNI0 14 12/27/2022 1823    HSTNI2 14 12/27/2022 2141    HSTNI4 14 12/27/2022 2341     BNP:   Results from last 7 days   Lab Units 12/29/22  0541   POTASSIUM mmol/L 3 7   CHLORIDE mmol/L 107   CO2 mmol/L 26   BUN mg/dL 17   CREATININE mg/dL 0 70   CALCIUM mg/dL 8 4   EGFR ml/min/1 73sq m 82     Coags:   Results from last 7 days   Lab Units 12/27/22  1743   PTT seconds 24   INR  1 21*     TSH:   Results from last 7 days   Lab Units 12/28/22  0514   TSH 3RD GENERATON uIU/mL 1 335     Magnesium:     Lipid Profile:     Imaging: I have personally reviewed pertinent reports     Echo 9/10/21:  •  Left Ventricle: There is mild concentric hypertrophy  Systolic function   is normal with an ejection fraction of 55-60%  There is grade I (mild)   diastolic dysfunction  •  Left Atrium: Left atrium cavity is moderately dilated  Left atrium   volume index is moderately increased  •  Aortic Valve: The aortic valve is trileaflet  The leaflets are mildly   calcified  There is mild sclerosis  •  Mitral Valve: There is mild annular calcification   There is mild   regurgitation with a centrally directed jet       EKG: Atrial fibrillation with rapid ventricular response  Rightward axis  ST & T wave abnormality, consider inferior ischemia  Abnormal ECG  No previous ECGs available  Confirmed by Redge Hodgkins ([id number]) on 12/28/2022 8:59:02 AM

## 2022-12-30 NOTE — DISCHARGE SUMMARY
[address]  Discharge- Thelma Cano 1940, [de-identified] y o  female MRN: [id number]  Unit/Bed#: -02 Encounter: [id number]  Primary Care Provider: Dirk Mehta DO   Date and time admitted to hospital: 12/27/2022  5:30 PM    * Fall at home, initial encounter  [facility]  She had a mechanical fall at home where she tripped over a rug and was unable to stand up afterwards for almost 30 hours  she did not take any of her medications or eat or drink for that time  Finally was able to call 911 and her family  Will hydrate the patient and get PT OT eval   Fortunately trauma series is negative for any fractures  Bruising noted secondary to being on the floor on her left breast and her legs   cont Eliquis     Atrial fibrillation with RVR ([address] )  Assessment & Plan  Presented with A  fib with RVR with heart rates in the 170s secondary to not taking her metoprolol for 30 hours  Start home dose of metoprolol 50 mg bid and placed on as needed IV Lopressor in case heart rate more than 120  12/28: Rates are still elevated in the 120s to 140s did require IV Lopressor and IV Cardizem boluses overnight  And IV Lopressor this morning as well  We will increase metoprolol to 75 mg twice daily 1 additional dose of IV Cardizem 15 mg ordered now  12/29: Still noted to have elevated heart rates this morning  Increased to Toprol- mg twice daily and added Cardizem 30 mg every 6    12/30: Rates are to be better controlled today  Change Cardizem to long-acting  If heart rates remain controlled today we will discharge home later today with outpatient follow-up with cardiology  Appreciate Cardiology consultation  Continue Eliquis for anticoagulation    Traumatic rhabdomyolysis [facility])  Assessment & Plan  fell at home and was on the ground for almost 30 hours    Noted to have CK total of 1293   placed on IV fluid hydration now resolved and off IV fluid hydration  renal function is normal so far  Seen by PT OT and recommend home with home health once medically cleared    Morbid obesity ([address] )  Assessment & Plan  BMI is 41 2  Will  on diet exercise and lifestyle modification    Elevated lactic acid level  Assessment & Plan  Lactic acidosis of 3 5 noted secondary to dehydration and rhabdomyolysis poa  placed on IV fluids  Also noted to have leukocytosis secondary to dehydration but no evidence of infection at this time  Discharging Physician / Practitioner: Sierra Velasco MD  PCP: Nuzhat Burton DO  Admission Date:   Admission Orders (From admission, onward)     Ordered        12/27/22 1855  INPATIENT ADMISSION  Once                      Discharge Date: 12/30/22    Medical Problems     Resolved Problems  Date Reviewed: 12/30/2022   None         Consultations During Hospital Stay:  · Cardiology,pt/ot    Procedures Performed:   · none    Significant Findings / Test Results:   XR Trauma chest portable    Result Date: 12/27/2022  Impression: No acute pulmonary pathology  Workstation performed: [id number]     TRAUMA - CT head wo contrast    Result Date: 12/27/2022  Impression: No acute intracranial pathology  In particular, no intracranial hemorrhage or calvarial fracture  The study was marked in [facility] for immediate notification  Workstation performed: [id number]     TRAUMA - CT spine cervical wo contrast    Result Date: 12/27/2022  Impression: No cervical spine fracture or traumatic malalignment  Moderate to severe degenerative changes most pronounced at C4-C7  The study was marked in [facility] for immediate notification  Workstation performed: [id number]     XR Trauma pelvis ap only 1 or 2 vw    Result Date: 12/27/2022  Impression: No definite fracture or hip dislocation  Workstation performed: [id number]     TRAUMA - CT chest abdomen pelvis w contrast    Result Date: 12/27/2022  Impression: No evidence of acute thoracic, abdominal, or pelvic trauma  Endometrial stripe thickened up to 6 mm    Recommend further evaluation with nonemergent pelvic ultrasound  This may be related to endometrial hyperplasia, an endometrial polyp or possibly neoplasm  1 6 x 1 4 cm somewhat irregular lesion in the inner lower right breast with bulky calcifications  This may be related to postsurgical changes from prior lumpectomy  Recommend correlation with prior breast imaging  Multiple additional incidental findings as above  The study was marked in [facility] for immediate notification  Workstation performed: [id number]     Incidental Findings:   none     Test Results Pending at Discharge (will require follow up):   · none     Outpatient Tests Requested:  · CBC, BMP in 2 weeks  · Patient follow-up with cardiology in 2 weeks    Complications: None    Reason for Admission: Fall at home    Hospital Course:     Marianne Trejo is a [de-identified] y o  female patient who originally presented to the hospital on 12/27/2022 due to fall at home causing traumatic rhabdomyolysis and patient was on the floor for 30 hours approximately  Also presented with A  fib with RVR  It took the next 2 to 3 days to regulate her heart rate  Her medications were adjusted to Toprol- mg twice daily and Cardizem  mg daily  Recommended outpatient cardiology follow-up and continue Eliquis for anticoagulation  Is also recommended home with home health services  Trauma series was performed on admission and fortunately patient did not sustain any fractures  Please see above list of diagnoses and related plan for additional information  Condition at Discharge: fair     Discharge Day Visit / Exam:     Subjective: Patient denies any chest pain or shortness of breath or palpitations today  States she feels well today    Vitals: Blood Pressure: (!) 137/103 (12/30/22 0728)  Pulse: 94 (12/30/22 0728)  Temperature: 97 9 °F (36 6 °C) (12/30/22 0728)  Temp Source: Oral (12/30/22 0728)  Respirations: 22 (12/30/22 0728)  Height: 5' (152 4 cm) (12/28/22 1149)  Weight - Scale: 98 6 kg (217 lb 6 4 oz) (12/30/22 0600)  SpO2: 99 % (12/30/22 0728)  Exam:   Physical Exam  Vitals and nursing note reviewed  Constitutional:       Appearance: Normal appearance  She is obese  HENT:      Head: Normocephalic and atraumatic  Right Ear: External ear normal       Left Ear: External ear normal       Nose: Nose normal       Mouth/Throat:      Pharynx: Oropharynx is clear  Eyes:      Pupils: Pupils are equal, round, and reactive to light  Cardiovascular:      Rate and Rhythm: Normal rate and regular rhythm  Heart sounds: Normal heart sounds  Pulmonary:      Effort: Pulmonary effort is normal       Breath sounds: Normal breath sounds  Abdominal:      General: Bowel sounds are normal       Palpations: Abdomen is soft  Tenderness: There is no abdominal tenderness  Musculoskeletal:         General: Normal range of motion  Cervical back: Normal range of motion and neck supple  Skin:     General: Skin is warm and dry  Capillary Refill: Capillary refill takes less than 2 seconds  Neurological:      General: No focal deficit present  Mental Status: She is alert and oriented to person, place, and time  Psychiatric:         Mood and Affect: Mood normal          Discussion with Family: will update daughter    Discharge instructions/Information to patient and family:   See after visit summary for information provided to patient and family  Provisions for Follow-Up Care:  See after visit summary for information related to follow-up care and any pertinent home health orders  Disposition:     Home with VNA Services (Reminder: Complete face to face encounter)    For Discharges to   [address] SNF:   · Not Applicable to this Patient - Not Applicable to this Patient    Planned Readmission: none     Discharge Statement:  I spent 35 minutes discharging the patient  This time was spent on the day of discharge   I had direct contact with the patient on the day of discharge  Greater than 50% of the total time was spent examining patient, answering all patient questions, arranging and discussing plan of care with patient as well as directly providing post-discharge instructions  Additional time then spent on discharge activities  Discharge Medications:  See after visit summary for reconciled discharge medications provided to patient and family        ** Please Note: This note has been constructed using a voice recognition system **

## 2023-01-02 LAB
BACTERIA BLD CULT: NORMAL
BACTERIA BLD CULT: NORMAL

## 2023-01-03 LAB
DME PARACHUTE DELIVERY DATE ACTUAL: NORMAL
DME PARACHUTE DELIVERY DATE REQUESTED: NORMAL
DME PARACHUTE ITEM DESCRIPTION: NORMAL
DME PARACHUTE ORDER STATUS: NORMAL
DME PARACHUTE SUPPLIER NAME: NORMAL
DME PARACHUTE SUPPLIER PHONE: NORMAL

## 2023-01-03 NOTE — UTILIZATION REVIEW
NOTIFICATION OF ADMISSION DISCHARGE   This is a Notification of Discharge from [address]  Please be advised that this patient has been discharge from our facility  Below you will find the admission and discharge date and time including the patient’s disposition  UTILIZATION REVIEW CONTACT:  [address]  Utilization   Network Utilization Review Department  Phone: [phone] x carefully listen to the prompts  All voicemails are confidential   Email: [email or web address]     ADMISSION INFORMATION  PRESENTATION DATE: 12/27/2022  5:30 PM  OBERVATION ADMISSION DATE:   INPATIENT ADMISSION DATE: 12/27/22  6:55 PM   DISCHARGE DATE: 12/30/2022  5:53 PM   DISPOSITION:Home with [address] INFORMATION:  Send all requests for admission clinical reviews, approved or denied determinations and any other requests to dedicated fax number below belonging to the campus where the patient is receiving treatment   List of dedicated fax numbers:  [address] DENIALS (Administrative/Medical Necessity) [phone]   [address] (Maternity/NICU/Pediatrics) [phone]   [facility] [phone]   [facility] 87 [phone]   [address] [phone]   [address] [facility] [phone] 90 [facility] [phone]   [address] [place] [address] 119 [phone]   [facility]  [phone]   [address] [facility] [phone]   [facility] [address] [facility] [phone]